# Patient Record
Sex: FEMALE | Race: WHITE | NOT HISPANIC OR LATINO | Employment: OTHER | URBAN - METROPOLITAN AREA
[De-identification: names, ages, dates, MRNs, and addresses within clinical notes are randomized per-mention and may not be internally consistent; named-entity substitution may affect disease eponyms.]

---

## 2019-08-29 ENCOUNTER — PES CALL (OUTPATIENT)
Dept: ADMINISTRATIVE | Facility: CLINIC | Age: 74
End: 2019-08-29

## 2024-11-28 ENCOUNTER — HOSPITAL ENCOUNTER (EMERGENCY)
Facility: OTHER | Age: 79
Discharge: HOME | End: 2024-11-28
Attending: EMERGENCY MEDICINE
Payer: MEDICARE

## 2024-11-28 VITALS
WEIGHT: 130 LBS | HEIGHT: 66 IN | SYSTOLIC BLOOD PRESSURE: 156 MMHG | TEMPERATURE: 98 F | DIASTOLIC BLOOD PRESSURE: 67 MMHG | RESPIRATION RATE: 18 BRPM | BODY MASS INDEX: 20.89 KG/M2 | OXYGEN SATURATION: 98 % | HEART RATE: 86 BPM

## 2024-11-28 DIAGNOSIS — S09.90XA INJURY OF HEAD, INITIAL ENCOUNTER: Primary | ICD-10-CM

## 2024-11-28 DIAGNOSIS — S00.11XA BLACK EYE OF RIGHT SIDE, INITIAL ENCOUNTER: ICD-10-CM

## 2024-11-28 PROCEDURE — 99285 EMERGENCY DEPT VISIT HI MDM: CPT | Mod: 25

## 2024-11-28 NOTE — DISCHARGE INSTRUCTIONS
Mrs. Engel,    Thank you for letting me care for you today! It was nice meeting you, and I hope you feel better soon.   If you would like access to your chart and what was done today please utilize the [facility].   Please come back to [facility] for all of your future medical needs.    Our goal in the emergency department is to always give you outstanding care and exceptional service. You may receive a survey by mail or e-mail in the next week regarding your experience in our ED. We would greatly appreciate you completing and returning the survey. Your feedback provides us with a way to recognize our staff who give very good care and it helps us learn how to improve when your experience was below our aspiration of excellence.     Sincerely,    Robert Lewis MD  Board Certified Emergency Physician

## 2024-11-28 NOTE — ED PROVIDER NOTES
Encounter Date: 2024       History     Chief Complaint   Patient presents with    Head Injury     Struck head on the shower when exiting this am. No LOC, not on blood thinners     This is a pleasant 79-year-old female presenting for evaluation of an episode today in which she hit the right side of her face while exiting the shower.  She had a brief period thereafter in which she laid on the ground.  Her son at the bedside notes that she has an element of dementia and because of the concern for a potential more serious head injury had presented to the emergency department.  He notes that she is not on any anticoagulation chronically at this time.    He works as an orthopedic surgeon in his mother is retired but in town visiting for the holiday currently, Thanksgiving.  She is otherwise behaving appropriately and at her neurologic baseline.    The history is provided by the patient and medical records.     Review of patient's allergies indicates:   Allergen Reactions    Codeine Nausea And Vomiting    Shellfish containing products Nausea And Vomiting     History reviewed. No pertinent past medical history.  Past Surgical History:   Procedure Laterality Date    BREAST SURGERY       SECTION      melanoma Right     arm, [facility]     No family history on file.  Social History     Tobacco Use    Smoking status: Former   Substance Use Topics    Alcohol use: No     Review of Systems  Constitutional-no fever  HEENT-positive bruised face  Eyes-no redness  Respiratory-no shortness of breath  Cardio-no chest pain  GI-no abdominal pain  Endocrine-no cold intolerance  -no difficulty urinating  MSK-no myalgias  Skin-no rashes  Allergy-no environmental allergy  Neurologic-, no headache  Hematology-no swollen nodes  Behavioral-no confusion  Physical Exam     Initial Vitals   BP Pulse Resp Temp SpO2   24 1401 24 1401 24 1401 24 1438 24 1401   (!) 156/67 86 18 97.7 °F (36.5 °C) 98 %      MAP        --                Physical Exam  Constitutional:  Generally well-appearing 79-year-old female in no obvious distress  Eyes: Conjunctivae normal.  Extraocular movements intact, pupils 2 mm briskly reactive and symmetric  ENT       Head: Normocephalic, atraumatic.  Mild ecchymoses lateral to the right eye and over the right zygoma, no fluctuance, no midface instability       Nose: Normal external appearance        Mouth/Throat: no strigulous respirations   Hematological/Lymphatic/Immunilogical: no visible lymphadenopathy   Cardiovascular: Normal rate,   Respiratory: Normal respiratory effort.   Gastrointestinal: non distended   Musculoskeletal: Normal range of motion in all extremities. No obvious deformities or swelling.  Neurologic: Alert, oriented. Normal speech and language. No gross focal neurologic deficits are appreciated.  Skin: Skin is warm, dry. No rash noted.  Psychiatric: Mood and affect are normal.   ED Course   Procedures  Labs Reviewed - No data to display       Imaging Results              CT Maxillofacial Without Contrast (Final result)  Result time 11/28/24 15:19:29      Final result by Timmy Flores MD (11/28/24 15:19:29)                   Impression:      No acute abnormality.      Electronically signed by: Timmy Flores  Date:    11/28/2024  Time:    15:19               Narrative:    EXAMINATION:  CT MAXILLOFACIAL WITHOUT CONTRAST    CLINICAL HISTORY:  Facial trauma, blunt;    TECHNIQUE:  Low dose axial images, sagittal and coronal reformations were obtained through the face.  Contrast was not administered.    COMPARISON:  None    FINDINGS:  No acute facial fractures are detected.    Nasal bones are intact.  The zygomatic arches are intact.  The orbits are intact.  Nasal septum is midline.    The mandible is intact.    Paranasal sinuses are clear.  Globes appear within normal limits.    No soft tissue mass or fluid collection.                                       CT Head Without Contrast  (Final result)  Result time 11/28/24 15:12:26      Final result by Timmy Flores MD (11/28/24 15:12:26)                   Impression:      1. No acute intracranial process.  2. Involutional changes with chronic microvascular ischemic changes.      Electronically signed by: Timmy Flores  Date:    11/28/2024  Time:    15:12               Narrative:    EXAMINATION:  CT HEAD WITHOUT CONTRAST    CLINICAL HISTORY:  Head trauma, minor (Age >= 65y);    TECHNIQUE:  Low dose axial CT images obtained throughout the head without intravenous contrast. Sagittal and coronal reconstructions were performed.    COMPARISON:  None.    FINDINGS:  Intracranial compartment:    Ventricles and sulci are normal in size for age without evidence of hydrocephalus. No extra-axial blood or fluid collections.    Moderate involutional changes with chronic microvascular ischemic changes in the periventricular white matter.  No parenchymal mass, hemorrhage, edema or major vascular distribution infarct.    Skull/extracranial contents (limited evaluation): No fracture. Mastoid air cells and paranasal sinuses are essentially clear.                                       Medications - No data to display  Medical Decision Making  Differential diagnosis-contusion, concussion, intraparenchymal hemorrhage, skull fracture    Problems Addressed:  Black eye of right side, initial encounter: acute illness or injury  Injury of head, initial encounter: acute illness or injury    Amount and/or Complexity of Data Reviewed  Independent Historian: caregiver     Details: Son at the bedside who works as an orthopedic surgeon notes that she had minor head injury but a brief period of time where she laid on the ground thereafter.      Radiology: ordered and independent interpretation performed. Decision-making details documented in ED Course.    Risk  OTC drugs.  Prescription drug management.  Diagnosis or treatment significantly limited by social determinants of  health.  Risk Details: Dementia complicates this patient's care has a social determinants of health                                      Clinical Impression:  Final diagnoses:  [S09.90XA] Injury of head, initial encounter (Primary)  [S00.11XA] Black eye of right side, initial encounter          ED Disposition Condition    Discharge Stable          ED Prescriptions    None       Follow-up Information       Follow up With Specialties Details Why Contact Info    [facility] - Emergency Dept Emergency Medicine Go to  As needed, For a follow up visit about today [address] [facility] [id number] [phone]             Robert Lewis MD  11/30/24 [id number]

## 2024-12-02 ENCOUNTER — HOSPITAL ENCOUNTER (OUTPATIENT)
Facility: HOSPITAL | Age: 79
Discharge: HOME | End: 2024-12-04
Attending: EMERGENCY MEDICINE | Admitting: EMERGENCY MEDICINE
Payer: MEDICARE

## 2024-12-02 DIAGNOSIS — R55 SYNCOPE: Primary | ICD-10-CM

## 2024-12-02 DIAGNOSIS — R42 DIZZINESS: ICD-10-CM

## 2024-12-02 DIAGNOSIS — R55 SYNCOPE, UNSPECIFIED SYNCOPE TYPE: ICD-10-CM

## 2024-12-02 LAB
ALBUMIN SERPL BCP-MCNC: 3.8 G/DL (ref 3.5–5.2)
ALP SERPL-CCNC: 65 U/L (ref 40–150)
ALT SERPL W/O P-5'-P-CCNC: 20 U/L (ref 10–44)
ANION GAP SERPL CALC-SCNC: 10 MMOL/L (ref 8–16)
AST SERPL-CCNC: 25 U/L (ref 10–40)
BASOPHILS # BLD AUTO: 0.03 K/UL (ref 0–0.2)
BASOPHILS NFR BLD: 0.3 % (ref 0–1.9)
BILIRUB SERPL-MCNC: 0.5 MG/DL (ref 0.1–1)
BNP SERPL-MCNC: 106 PG/ML (ref 0–99)
BUN SERPL-MCNC: 17 MG/DL (ref 8–23)
CALCIUM SERPL-MCNC: 9 MG/DL (ref 8.7–10.5)
CHLORIDE SERPL-SCNC: 105 MMOL/L (ref 95–110)
CO2 SERPL-SCNC: 24 MMOL/L (ref 23–29)
CREAT SERPL-MCNC: 0.9 MG/DL (ref 0.5–1.4)
DIFFERENTIAL METHOD BLD: ABNORMAL
EOSINOPHIL # BLD AUTO: 0 K/UL (ref 0–0.5)
EOSINOPHIL NFR BLD: 0.1 % (ref 0–8)
ERYTHROCYTE [DISTWIDTH] IN BLOOD BY AUTOMATED COUNT: 12.6 % (ref 11.5–14.5)
EST. GFR  (NO RACE VARIABLE): >60 ML/MIN/1.73 M^2
GLUCOSE SERPL-MCNC: 118 MG/DL (ref 70–110)
HCT VFR BLD AUTO: 38.7 % (ref 37–48.5)
HGB BLD-MCNC: 12.7 G/DL (ref 12–16)
IMM GRANULOCYTES # BLD AUTO: 0.03 K/UL (ref 0–0.04)
IMM GRANULOCYTES NFR BLD AUTO: 0.3 % (ref 0–0.5)
LYMPHOCYTES # BLD AUTO: 0.8 K/UL (ref 1–4.8)
LYMPHOCYTES NFR BLD: 9.4 % (ref 18–48)
MCH RBC QN AUTO: 33.2 PG (ref 27–31)
MCHC RBC AUTO-ENTMCNC: 32.8 G/DL (ref 32–36)
MCV RBC AUTO: 101 FL (ref 82–98)
MONOCYTES # BLD AUTO: 0.5 K/UL (ref 0.3–1)
MONOCYTES NFR BLD: 5.7 % (ref 4–15)
NEUTROPHILS # BLD AUTO: 7.4 K/UL (ref 1.8–7.7)
NEUTROPHILS NFR BLD: 84.2 % (ref 38–73)
NRBC BLD-RTO: 0 /100 WBC
PLATELET # BLD AUTO: 199 K/UL (ref 150–450)
PMV BLD AUTO: 9.6 FL (ref 9.2–12.9)
POTASSIUM SERPL-SCNC: 3.8 MMOL/L (ref 3.5–5.1)
PROT SERPL-MCNC: 7 G/DL (ref 6–8.4)
RBC # BLD AUTO: 3.82 M/UL (ref 4–5.4)
SODIUM SERPL-SCNC: 139 MMOL/L (ref 136–145)
TROPONIN I SERPL DL<=0.01 NG/ML-MCNC: 0.01 NG/ML (ref 0–0.03)
TROPONIN I SERPL DL<=0.01 NG/ML-MCNC: <0.006 NG/ML (ref 0–0.03)
WBC # BLD AUTO: 8.8 K/UL (ref 3.9–12.7)

## 2024-12-02 PROCEDURE — 84484 ASSAY OF TROPONIN QUANT: CPT | Performed by: EMERGENCY MEDICINE

## 2024-12-02 PROCEDURE — G0378 HOSPITAL OBSERVATION PER HR: HCPCS

## 2024-12-02 PROCEDURE — 85025 COMPLETE CBC W/AUTO DIFF WBC: CPT | Performed by: EMERGENCY MEDICINE

## 2024-12-02 PROCEDURE — 93010 ELECTROCARDIOGRAM REPORT: CPT | Mod: ,,, | Performed by: INTERNAL MEDICINE

## 2024-12-02 PROCEDURE — 83880 ASSAY OF NATRIURETIC PEPTIDE: CPT | Performed by: EMERGENCY MEDICINE

## 2024-12-02 PROCEDURE — 80053 COMPREHEN METABOLIC PANEL: CPT | Performed by: EMERGENCY MEDICINE

## 2024-12-02 PROCEDURE — 93005 ELECTROCARDIOGRAM TRACING: CPT

## 2024-12-02 PROCEDURE — 25000003 PHARM REV CODE 250: Performed by: PHYSICIAN ASSISTANT

## 2024-12-02 RX ORDER — DONEPEZIL HYDROCHLORIDE 5 MG/1
5 TABLET, FILM COATED ORAL NIGHTLY
Status: DISCONTINUED | OUTPATIENT
Start: 2024-12-02 | End: 2024-12-02

## 2024-12-02 RX ORDER — ACETAMINOPHEN 500 MG
1000 TABLET ORAL EVERY 8 HOURS PRN
Status: DISCONTINUED | OUTPATIENT
Start: 2024-12-02 | End: 2024-12-04 | Stop reason: HOSPADM

## 2024-12-02 RX ORDER — DONEPEZIL HYDROCHLORIDE 5 MG/1
10 TABLET, FILM COATED ORAL NIGHTLY
Status: DISCONTINUED | OUTPATIENT
Start: 2024-12-02 | End: 2024-12-04 | Stop reason: HOSPADM

## 2024-12-02 RX ORDER — METOCLOPRAMIDE HYDROCHLORIDE 5 MG/ML
10 INJECTION INTRAMUSCULAR; INTRAVENOUS EVERY 6 HOURS PRN
Status: DISCONTINUED | OUTPATIENT
Start: 2024-12-02 | End: 2024-12-04 | Stop reason: HOSPADM

## 2024-12-02 RX ORDER — DONEPEZIL HYDROCHLORIDE 10 MG/1
10 TABLET, FILM COATED ORAL DAILY
COMMUNITY
Start: 2024-11-18

## 2024-12-02 RX ORDER — ONDANSETRON HYDROCHLORIDE 2 MG/ML
4 INJECTION, SOLUTION INTRAVENOUS EVERY 6 HOURS PRN
Status: DISCONTINUED | OUTPATIENT
Start: 2024-12-02 | End: 2024-12-04 | Stop reason: HOSPADM

## 2024-12-02 RX ADMIN — DONEPEZIL HYDROCHLORIDE 10 MG: 10 TABLET ORAL at 09:12

## 2024-12-02 NOTE — ED PROVIDER NOTES
Chief Complaint   Loss of Consciousness (Pt had two episodes of syncope today. Pt seen recently for a fall on . )      History Of Present Illness   Parvin Engel is a 79 y.o. female presenting with be while at the airport today.  She was waiting to board a flight to return home after visiting her son and daughter-in-law, who are both [facility] physicians.  Family member who accompanied her and states that she passed out while seated in the gate waiting area for her flight.  The patient denies prodrome.  No history of dysrhythmia.  She had felt well today before this occurred.  Her son states that she slipped in the shower 3 days ago and went to [facility] where she had a head CT.  She has not fallen since.  She has some bruising on her eye that was from that fall.    Independent Historian: Patient's son provided additional details    Review of patient's allergies indicates:   Allergen Reactions    Codeine Nausea And Vomiting    Shellfish containing products Nausea And Vomiting       No current facility-administered medications on file prior to encounter.     Current Outpatient Medications on File Prior to Encounter   Medication Sig Dispense Refill    aspirin (ECOTRIN) 325 MG EC tablet Take 1 tablet (325 mg total) by mouth 2 (two) times daily. 42 tablet 0    calcium citrate-vitamin D3 315-200 mg (CITRACAL+D) 315-200 mg-unit per tablet Take 1 tablet by mouth 2 (two) times daily.      hydrocodone-acetaminophen 10-325mg (NORCO)  mg Tab Take 1 tablet by mouth every 4 to 6 hours as needed. 40 tablet 0    tramadol (ULTRAM) 50 mg tablet Take 1 tablet (50 mg total) by mouth every 4 to 6 hours as needed for Pain. 40 tablet 0       Past History   As per HPI and below:  No past medical history on file.  Past Surgical History:   Procedure Laterality Date    BREAST SURGERY       SECTION      melanoma Right     arm, [address] [place] [address]       Social History     Tobacco Use    Smoking status: Former   Substance Use  "Topics    Alcohol use: No       No family history on file.    Physical Exam     Vitals:    12/02/24 1653 12/02/24 1750 12/02/24 1900   BP: 130/80 (!) 178/75 (!) 158/71   BP Location:  Right arm    Pulse: 80 76 76   Resp: 18 18 15   Temp: 98.2 °F (36.8 °C) 98 °F (36.7 °C)    TempSrc: Oral Oral    SpO2: 97% 100% 99%   Weight: 59 kg (130 lb) 59 kg (130 lb 1.1 oz)    Height:  5' 6" (1.676 m)      Appearance: No acute distress.  Skin: No rashes seen.  Good turgor.  No abrasions.  No ecchymoses.  Eyes: No conjunctival injection.  Ecchymosis around left lower orbit area.  ENT: Oropharynx clear.    Chest: Clear to auscultation bilaterally.  Good air movement.  No wheezes.  No rhonchi.  Cardiovascular: Regular rate and rhythm.  No murmurs. No gallops. No rubs.  Abdomen: Soft.  Not distended.  Nontender.  No guarding.  No rebound.  Musculoskeletal: Good range of motion all joints.  No deformities.  Neck supple.  No meningismus.  Neurologic: Motor intact.  Sensation intact.   Cranial nerves intact.  Mental Status:  Alert and oriented x 3.  Appropriate, conversant.      Initial MDM   Syncope while seated without prodrome at the airport today.  This is concerning for cardiogenic syncope.  Differential also includes vasovagal, reflex syncope.  She will need to be observed on telemetry for 24 hours.  Will do cardiac workup.      Medications Given   Medications - No data to display    Results and Course     Abnormal Labs Reviewed   CBC W/ AUTO DIFFERENTIAL - Abnormal; Notable for the following components:       Result Value    RBC 3.82 (*)      (*)     MCH 33.2 (*)     Lymph # 0.8 (*)     Gran % 84.2 (*)     Lymph % 9.4 (*)     All other components within normal limits   COMPREHENSIVE METABOLIC PANEL - Abnormal; Notable for the following components:    Glucose 118 (*)     All other components within normal limits   B-TYPE NATRIURETIC PEPTIDE - Abnormal; Notable for the following components:     (*)     All other " components within normal limits       Imaging Results    None         ED Course as of 12/02/24 1927   Mon Dec 02, 2024   1746 EKG 12-lead  EKG shows normal sinus rhythm and no acute ischemia per my independent interpretation.     [DC]   1846 WBC: 8.80 [DC]   1846 Hemoglobin: 12.7 [DC]   1846 Platelet Count: 199 [DC]   1917 BNP(!): 106 [DC]   1917 Troponin I: 0.015 [DC]   1918 Creatinine: 0.9 [DC]      ED Course User Index  [DC] Jesu Dias MD               MDM, Impression and Plan   79 y.o. female with syncope, no prodrome, no trauma.  Initial workup benign.  Discussed with observation team for observation         Final diagnoses:  [R55] Syncope (Primary)        ED Disposition Condition    Observation Stable                  Jesu Dias MD  12/02/24 1929

## 2024-12-03 PROBLEM — R55 SYNCOPE: Status: ACTIVE | Noted: 2024-12-03

## 2024-12-03 LAB
ANION GAP SERPL CALC-SCNC: 13 MMOL/L (ref 8–16)
ASCENDING AORTA: 3.47 CM
AV AREA BY CONTINUOUS VTI: 2.6 CM2
AV INDEX (PROSTH): 0.85
AV LVOT MEAN GRADIENT: 1 MMHG
AV LVOT PEAK GRADIENT: 2 MMHG
AV MEAN GRADIENT: 1.7 MMHG
AV PEAK GRADIENT: 2 MMHG
AV VALVE AREA BY VELOCITY RATIO: 3.1 CM²
AV VALVE AREA: 2.7 CM2
AV VELOCITY RATIO: 1
BASOPHILS # BLD AUTO: 0.03 K/UL (ref 0–0.2)
BASOPHILS NFR BLD: 0.5 % (ref 0–1.9)
BILIRUB UR QL STRIP: NEGATIVE
BSA FOR ECHO PROCEDURE: 1.66 M2
BUN SERPL-MCNC: 15 MG/DL (ref 8–23)
CALCIUM SERPL-MCNC: 8.5 MG/DL (ref 8.7–10.5)
CHLORIDE SERPL-SCNC: 106 MMOL/L (ref 95–110)
CLARITY UR REFRACT.AUTO: CLEAR
CO2 SERPL-SCNC: 21 MMOL/L (ref 23–29)
COLOR UR AUTO: YELLOW
CREAT SERPL-MCNC: 0.8 MG/DL (ref 0.5–1.4)
CV ECHO LV RWT: 0.37 CM
DIFFERENTIAL METHOD BLD: ABNORMAL
DOP CALC AO PEAK VEL: 0.7 M/S
DOP CALC AO VTI: 17.6 CM
DOP CALC LVOT AREA: 3.1 CM2
DOP CALC LVOT DIAMETER: 2 CM
DOP CALC LVOT PEAK VEL: 0.7 M/S
DOP CALC LVOT STROKE VOLUME: 47.1 CM3
DOP CALCLVOT PEAK VEL VTI: 15 CM
E WAVE DECELERATION TIME: 132.8 MS
E/A RATIO: 0.62
E/E' RATIO: 8.4 M/S
ECHO EF ESTIMATED: 72 %
ECHO LV POSTERIOR WALL: 0.7 CM (ref 0.6–1.1)
EOSINOPHIL # BLD AUTO: 0.1 K/UL (ref 0–0.5)
EOSINOPHIL NFR BLD: 0.8 % (ref 0–8)
ERYTHROCYTE [DISTWIDTH] IN BLOOD BY AUTOMATED COUNT: 12.6 % (ref 11.5–14.5)
EST. GFR  (NO RACE VARIABLE): >60 ML/MIN/1.73 M^2
FRACTIONAL SHORTENING: 39.5 % (ref 28–44)
GLUCOSE SERPL-MCNC: 91 MG/DL (ref 70–110)
GLUCOSE UR QL STRIP: NEGATIVE
HCT VFR BLD AUTO: 35.8 % (ref 37–48.5)
HGB BLD-MCNC: 12.2 G/DL (ref 12–16)
HGB UR QL STRIP: NEGATIVE
IMM GRANULOCYTES # BLD AUTO: 0.02 K/UL (ref 0–0.04)
IMM GRANULOCYTES NFR BLD AUTO: 0.3 % (ref 0–0.5)
INTERVENTRICULAR SEPTUM: 0.9 CM (ref 0.6–1.1)
KETONES UR QL STRIP: ABNORMAL
LA MAJOR: 3.72 CM
LA MINOR: 3.18 CM
LA WIDTH: 3.54 CM
LEFT ATRIUM SIZE: 2.93 CM
LEFT ATRIUM VOLUME INDEX: 18.1 ML/M2
LEFT ATRIUM VOLUME: 30.23 CM3
LEFT INTERNAL DIMENSION IN SYSTOLE: 2.3 CM (ref 2.1–4)
LEFT VENTRICLE DIASTOLIC VOLUME INDEX: 38.09 ML/M2
LEFT VENTRICLE DIASTOLIC VOLUME: 63.61 ML
LEFT VENTRICLE MASS INDEX: 51.5 G/M2
LEFT VENTRICLE SYSTOLIC VOLUME INDEX: 10.8 ML/M2
LEFT VENTRICLE SYSTOLIC VOLUME: 18.04 ML
LEFT VENTRICULAR INTERNAL DIMENSION IN DIASTOLE: 3.8 CM (ref 3.5–6)
LEFT VENTRICULAR MASS: 86 G
LEUKOCYTE ESTERASE UR QL STRIP: NEGATIVE
LV LATERAL E/E' RATIO: 7
LV SEPTAL E/E' RATIO: 10.5
LYMPHOCYTES # BLD AUTO: 1.7 K/UL (ref 1–4.8)
LYMPHOCYTES NFR BLD: 28.8 % (ref 18–48)
MCH RBC QN AUTO: 33.9 PG (ref 27–31)
MCHC RBC AUTO-ENTMCNC: 34.1 G/DL (ref 32–36)
MCV RBC AUTO: 99 FL (ref 82–98)
MONOCYTES # BLD AUTO: 0.5 K/UL (ref 0.3–1)
MONOCYTES NFR BLD: 7.4 % (ref 4–15)
MV PEAK A VEL: 0.68 M/S
MV PEAK E VEL: 0.42 M/S
NEUTROPHILS # BLD AUTO: 3.8 K/UL (ref 1.8–7.7)
NEUTROPHILS NFR BLD: 62.2 % (ref 38–73)
NITRITE UR QL STRIP: NEGATIVE
NRBC BLD-RTO: 0 /100 WBC
OHS CV RV/LV RATIO: 0.97 CM
OHS QRS DURATION: 76 MS
OHS QTC CALCULATION: 463 MS
PH UR STRIP: 6 [PH] (ref 5–8)
PISA TR MAX VEL: 2.57 M/S
PLATELET # BLD AUTO: 196 K/UL (ref 150–450)
PMV BLD AUTO: 9.9 FL (ref 9.2–12.9)
POTASSIUM SERPL-SCNC: 3.7 MMOL/L (ref 3.5–5.1)
PROT UR QL STRIP: NEGATIVE
RBC # BLD AUTO: 3.6 M/UL (ref 4–5.4)
RIGHT VENTRICLE DIASTOLIC BASEL DIMENSION: 3.7 CM
RV TISSUE DOPPLER FREE WALL SYSTOLIC VELOCITY 1 (APICAL 4 CHAMBER VIEW): 15.45 CM/S
SINUS: 3.26 CM
SODIUM SERPL-SCNC: 140 MMOL/L (ref 136–145)
SP GR UR STRIP: 1.02 (ref 1–1.03)
STJ: 2.8 CM
TDI LATERAL: 0.06 M/S
TDI SEPTAL: 0.04 M/S
TDI: 0.05 M/S
TR MAX PG: 26 MMHG
TRICUSPID ANNULAR PLANE SYSTOLIC EXCURSION: 3.03 CM
TV PEAK GRADIENT: 26 MMHG
URN SPEC COLLECT METH UR: ABNORMAL
WBC # BLD AUTO: 6.05 K/UL (ref 3.9–12.7)
Z-SCORE OF LEFT VENTRICULAR DIMENSION IN END DIASTOLE: -2.06
Z-SCORE OF LEFT VENTRICULAR DIMENSION IN END SYSTOLE: -1.81

## 2024-12-03 PROCEDURE — 25000003 PHARM REV CODE 250: Performed by: PHYSICIAN ASSISTANT

## 2024-12-03 PROCEDURE — 81003 URINALYSIS AUTO W/O SCOPE: CPT | Performed by: PHYSICIAN ASSISTANT

## 2024-12-03 PROCEDURE — G0378 HOSPITAL OBSERVATION PER HR: HCPCS

## 2024-12-03 PROCEDURE — 99204 OFFICE O/P NEW MOD 45 MIN: CPT | Mod: ,,, | Performed by: PSYCHIATRY & NEUROLOGY

## 2024-12-03 PROCEDURE — 25500020 PHARM REV CODE 255: Performed by: NURSE PRACTITIONER

## 2024-12-03 PROCEDURE — 99204 OFFICE O/P NEW MOD 45 MIN: CPT | Mod: ,,, | Performed by: INTERNAL MEDICINE

## 2024-12-03 PROCEDURE — 63600175 PHARM REV CODE 636 W HCPCS: Performed by: PHYSICIAN ASSISTANT

## 2024-12-03 PROCEDURE — 85025 COMPLETE CBC W/AUTO DIFF WBC: CPT | Performed by: PHYSICIAN ASSISTANT

## 2024-12-03 PROCEDURE — 99204 OFFICE O/P NEW MOD 45 MIN: CPT | Mod: GC,,, | Performed by: STUDENT IN AN ORGANIZED HEALTH CARE EDUCATION/TRAINING PROGRAM

## 2024-12-03 PROCEDURE — 80048 BASIC METABOLIC PNL TOTAL CA: CPT | Performed by: PHYSICIAN ASSISTANT

## 2024-12-03 PROCEDURE — A9585 GADOBUTROL INJECTION: HCPCS | Performed by: NURSE PRACTITIONER

## 2024-12-03 RX ORDER — GADOBUTROL 604.72 MG/ML
6 INJECTION INTRAVENOUS
Status: COMPLETED | OUTPATIENT
Start: 2024-12-03 | End: 2024-12-03

## 2024-12-03 RX ADMIN — DONEPEZIL HYDROCHLORIDE 10 MG: 10 TABLET ORAL at 08:12

## 2024-12-03 RX ADMIN — GADOBUTROL 6 ML: 604.72 INJECTION INTRAVENOUS at 04:12

## 2024-12-03 RX ADMIN — SODIUM CHLORIDE, POTASSIUM CHLORIDE, SODIUM LACTATE AND CALCIUM CHLORIDE 500 ML: 600; 310; 30; 20 INJECTION, SOLUTION INTRAVENOUS at 10:12

## 2024-12-03 NOTE — PROGRESS NOTES
ED Observation Unit  Progress Note      HPI   Parvin Engel is a 79 y.o. female with a history of Alzheimer's dementia who presents to [facility] ED on 2024 for emergent evaluation of syncope today.      Patient was at the airport waiting to board a flight to return home after visiting her son and daughter-in-law, who are both [facility] physicians.  Family member who accompanied her states that she passed out while seated in the gate waiting area for her flight.  She is alert and oriented to person, , and situation, but not year or location. The patient denies prodrome symptoms prior to syncope today. She had felt well today before this occurred.  Her son states that she slipped in the shower 3 days ago and went to [facility] ED where she had a head CT that was negative.  She has not fallen since.  She has some bruising on her eye that was from that fall. Son also mentions a similar episode over the summer while she pasted out at the park. They have also noticed patient having episodes of clamminess with exertion in the past, but none recently. She did eat lunch today. She denies neck pain, back pain, chest pain, SOB, abd pain, extremity weakness, paresthesias, history of arrhythmias, MIs, HF, DVT/PE, hormone use, tobacco use, or cancer. She also denies infections symptoms such as cough, nausea, vomiting, diarrhea, dysuria, frequency.     In the ED, ECG with NSR. Nonspecific T wave changes noted. Normal intervals. No STEMI. Labs without signs of leukocytosis, anemia, electrolyte abn, or KADE. Glucose 118. Normal LFTs. 1st troponin 0.015. .      I reviewed the ED Provider Note dated 2024 prior to my evaluation of this patient.  I reviewed all labs and imaging performed in the Main ED, prior to patient being placed in Observation. Patient was placed in the ED Observation Unit for syncope.     Interval History   ***    PMHx   History reviewed. No pertinent past medical history.   Past Surgical History:    Procedure Laterality Date    BREAST SURGERY       SECTION      [facility]        Family Hx   No family history on file.     Social Hx   Social History     Socioeconomic History    Marital status:    Tobacco Use    Smoking status: Former   Substance and Sexual Activity    Alcohol use: No     Social Drivers of Health     Housing Stability: Low Risk  (12/3/2024)    Housing Stability Vital Sign     Unable to Pay for Housing in the Last Year: No     Homeless in the Last Year: No        Vital Signs   Vitals:    24 2156 24 0346 24 0626 24 0730   BP: (!) 155/72 121/67 (!) 147/67 (!) 140/65   BP Location:    Left arm   Patient Position:    Lying   Pulse: 80 74 80 69   Resp: 20   16   Temp:  98.2 °F (36.8 °C) 98.1 °F (36.7 °C) 98 °F (36.7 °C)   TempSrc:  Oral Oral Oral   SpO2: 98% 96% 98% 95%   Weight:       Height:            Review of Systems  Per HPI and interval history.    Brief Physical Exam/Reassessment   Constitutional:       General: She is not in acute distress.     Appearance: She is not ill-appearing, toxic-appearing or diaphoretic.   HENT:      Head: Contusion present. No raccoon eyes.        Right Ear: External ear normal.      Left Ear: External ear normal.      Nose: Nose normal.   Eyes:      General: No scleral icterus.     Extraocular Movements: Extraocular movements intact.      Right eye: No nystagmus.      Left eye: No nystagmus.      Conjunctiva/sclera:      Right eye: Right conjunctiva is not injected.   Cardiovascular:      Rate and Rhythm: Normal rate and regular rhythm.   Pulmonary:      Effort: No respiratory distress.      Breath sounds: No wheezing.   Abdominal:      General: There is no distension.   Musculoskeletal:         General: Normal range of motion.      Cervical back: Normal range of motion. No rigidity. No pain with movement. Normal range of motion.      Right lower leg: No edema.      Comments: FROM and 5/5 strength  throughout. Sensations grossly intact. Able to bear weight and ambulate without assistance.   Skin:     Coloration: Skin is not jaundiced.      Findings: No rash.   Neurological:      General: No focal deficit present.      Mental Status: She is alert. Mental status is at baseline.      Comments: Alert and oriented to person, , and situation, but not year or location.     Labs/Imaging   Labs Reviewed   CBC W/ AUTO DIFFERENTIAL - Abnormal       Result Value    WBC 8.80      RBC 3.82 (*)     Hemoglobin 12.7      Hematocrit 38.7       (*)     MCH 33.2 (*)     MCHC 32.8      RDW 12.6      Platelets 199      MPV 9.6      Immature Granulocytes 0.3      Gran # (ANC) 7.4      Immature Grans (Abs) 0.03      Lymph # 0.8 (*)     Mono # 0.5      Eos # 0.0      Baso # 0.03      nRBC 0      Gran % 84.2 (*)     Lymph % 9.4 (*)     Mono % 5.7      Eosinophil % 0.1      Basophil % 0.3      Differential Method Automated     COMPREHENSIVE METABOLIC PANEL - Abnormal    Sodium 139      Potassium 3.8      Chloride 105      CO2 24      Glucose 118 (*)     BUN 17      Creatinine 0.9      Calcium 9.0      Total Protein 7.0      Albumin 3.8      Total Bilirubin 0.5      Alkaline Phosphatase 65      AST 25      ALT 20      eGFR >60.0      Anion Gap 10     B-TYPE NATRIURETIC PEPTIDE - Abnormal     (*)    URINALYSIS, REFLEX TO URINE CULTURE - Abnormal    Specimen UA Urine, Clean Catch      Color, UA Yellow      Appearance, UA Clear      pH, UA 6.0      Specific Gravity, UA 1.020      Protein, UA Negative      Glucose, UA Negative      Ketones, UA 2+ (*)     Bilirubin (UA) Negative      Occult Blood UA Negative      Nitrite, UA Negative      Leukocytes, UA Negative      Narrative:     Specimen Source->Urine   CBC W/ AUTO DIFFERENTIAL - Abnormal    WBC 6.05      RBC 3.60 (*)     Hemoglobin 12.2      Hematocrit 35.8 (*)     MCV 99 (*)     MCH 33.9 (*)     MCHC 34.1      RDW 12.6      Platelets 196      MPV 9.9      Immature  Granulocytes 0.3      Gran # (ANC) 3.8      Immature Grans (Abs) 0.02      Lymph # 1.7      Mono # 0.5      Eos # 0.1      Baso # 0.03      nRBC 0      Gran % 62.2      Lymph % 28.8      Mono % 7.4      Eosinophil % 0.8      Basophil % 0.5      Differential Method Automated     BASIC METABOLIC PANEL - Abnormal    Sodium 140      Potassium 3.7      Chloride 106      CO2 21 (*)     Glucose 91      BUN 15      Creatinine 0.8      Calcium 8.5 (*)     Anion Gap 13      eGFR >60.0     TROPONIN I    Troponin I 0.015     TROPONIN I    Troponin I <0.006        Imaging Results              US Carotid Bilateral (Final result)  Result time 12/02/24 23:49:04      Final result by John Bianchi DO (12/02/24 23:49:04)                   Impression:      No evidence of a hemodynamically significant carotid bifurcation stenosis.      Electronically signed by: John Bianchi  Date:    12/02/2024  Time:    23:49               Narrative:    EXAMINATION:  US CAROTID BILATERAL    CLINICAL HISTORY:  Syncope and collapse    TECHNIQUE:  Grayscale and color Doppler ultrasound examination of the carotid and vertebral artery systems bilaterally.  Stenosis estimates are per the NASCET measurement criteria.    COMPARISON:  None.    FINDINGS:  Right:    Internal Carotid Artery (ICA) peak systolic velocity 112 cm/sec    ICA/CCA peak systolic velocity ratio: 1.6    Plaque formation: Homogeneous    Vertebral artery: Antegrade flow and normal waveform.    Left:    Internal Carotid Artery (ICA)  peak systolic velocity 121 cm/sec    ICA/CCA peak systolic velocity ratio: 1.4    Plaque formation: Homogeneous    Vertebral artery: Antegrade flow and normal waveform.                                       CT Head Without Contrast (Final result)  Result time 12/02/24 22:48:44      Final result by Nikolas Banegas MD (12/02/24 22:48:44)                   Impression:      No major vascular distribution infarction.    No acute intracranial hemorrhage or  depressed calvarial fracture.    Evidence of chronic microvascular ischemic change.    Electronically signed by resident: Deangelo Ghosh  Date:    12/02/2024  Time:    21:43    Electronically signed by: Nikolas Banegas  Date:    12/02/2024  Time:    22:48               Narrative:    EXAMINATION:  CT HEAD WITHOUT CONTRAST    CLINICAL HISTORY:  Head trauma, minor (Age >= 65y);Mental status change, unknown cause;    TECHNIQUE:  Low dose axial CT images obtained throughout the head without intravenous contrast. Sagittal and coronal reconstructions were performed.    COMPARISON:  CT head 11/28/2024    FINDINGS:  Intracranial compartment:    Ventricles and sulci are unchanged in size and configuration without evidence of hydrocephalus. No extra-axial blood or fluid collections.    The brain parenchyma appears unchanged.  There are patchy areas of hypoattenuation in the supratentorial white matter, which is nonspecific and most likely reflects changes of chronic microvascular ischemic changes.  No new major vascular distribution infarction.  No acute parenchymal hemorrhage.  No mass effect or midline shift.    Coarse intracranial ICA and vertebrobasilar arterial calcifications.    Skull/extracranial contents (limited evaluation):    No depressed calvarial fracture.  Mastoid air cells and paranasal sinuses are essentially clear.                                       I reviewed all labs, imaging reports and EKGs (if performed) associated with this ED/EDOU visit.    Plan   -In the ED, ECG with NSR. Nonspecific T wave changes noted. Normal intervals. No STEMI. Labs without signs of leukocytosis, anemia, electrolyte abn, or KADE. Glucose 118. Normal LFTs. 1st troponin 0.015. .   -No prodromal symptoms today with episode. History of syncope over the summer and symptoms with exertion in the past.  -CT head and US carotid ordered  -Check UA  -ECHO tomorrow  -Tele  -Repeat AM labs    I have discussed this case with SUNNY Graham.

## 2024-12-03 NOTE — ASSESSMENT & PLAN NOTE
Parvin Engel is a 79 y.o. female with a history of Alzheimer's dementia who presents to [facility] ED on 12/2/2024 for emergent evaluation of syncope. Electrophysiology has been consulted for evaluation of  syncope.       EKG 12/02/2024 normal sinus rhythm  EKG 12/2/16 normal sinus rhythm   CT head negative  Telemetry: Normal sinus rhythm  No any lab abnormalities  TTE 12/3/24 showed LVEF 60 - 65%. no other hemodynamically significant structural valvular abnormality.    Continue Telemetry monitoring   Get orthostatic vitals  In view of frequent episodes of syncope without any prodromal symptoms we recommend evaluating the cardiac etiology  Recommend 30 day cardiac event monitor

## 2024-12-03 NOTE — PLAN OF CARE
[facility] Emergency Dept  Initial Discharge Assessment       Primary Care Provider: Sofiya, Primary Doctor    Admission Diagnosis: Syncope [R55]    Admission Date: 12/2/2024  Expected Discharge Date:     Transition of Care Barriers: (P) None, Patient has difficulties with reality. Patient's daughter would be a better person to contact for continuity of care     Payor: MEDICARE / Plan: MEDICARE PART A & B / Product Type: Government /     Extended Emergency Contact Information  Primary Emergency Contact: Johnson Engel  Address: [address] [place] [address]  Home Phone: [phone]  Mobile Phone: [phone]  Relation: Spouse    Discharge Plan A: (P) Return to nursing home  Discharge Plan B: (P) Return to Nursing Home      [facility]  [address] [facility] [address]  [facility] [address]  Phone: [phone] Fax: [phone]      Initial Assessment (most recent)       Adult Discharge Assessment - 12/03/24 0358          Discharge Assessment    Assessment Type Discharge Planning Assessment (P)      Confirmed/corrected address, phone number and insurance Yes (P)      Confirmed Demographics Correct on Facesheet (P)      Source of Information health record (P)      When was your last doctors appointment? -- (P)    unknown    Does patient/caregiver understand observation status Yes (P)      Communicated JOLEEN with patient/caregiver Yes (P)      People in Home facility resident (P)      Facility Arrived From: Nursing home (P)      Do you expect to return to your current living situation? Yes (P)      Do you have help at home or someone to help you manage your care at home? Yes (P)      Who are your caregiver(s) and their phone number(s)? Facility (P)      Prior to hospitilization cognitive status: Not Oriented to Place;Not Oriented to Time (P)      Current cognitive status: Not Oriented to Place;Not Oriented to Time (P)      Walking or Climbing Stairs Difficulty yes (P)       Walking or Climbing Stairs ambulation difficulty, requires equipment (P)      Dressing/Bathing Difficulty yes (P)      Dressing/Bathing bathing difficulty, requires equipment (P)      Home Accessibility wheelchair accessible (P)      Home Layout Able to live on 1st floor (P)      Readmission within 30 days? No (P)      Patient currently being followed by outpatient case management? No (P)      Do you currently have service(s) that help you manage your care at home? No (P)      Do you take prescription medications? Yes (P)      Do you have prescription coverage? Yes (P)      Coverage Medicare (P)      Do you have any problems affording any of your prescribed medications? No (P)      Who is going to help you get home at discharge? Denys (P)      How do you get to doctors appointments? family or friend will provide (P)      Are you on dialysis? No (P)      Do you take coumadin? No (P)      Discharge Plan A Return to nursing home (P)      Discharge Plan B Return to Nursing Home (P)      DME Needed Upon Discharge  none (P)      Transition of Care Barriers None (P)         Physical Activity    On average, how many days per week do you engage in moderate to strenuous exercise (like a brisk walk)? 0 days (P)      On average, how many minutes do you engage in exercise at this level? 0 min (P)         Financial Resource Strain    How hard is it for you to pay for the very basics like food, housing, medical care, and heating? Not hard at all (P)         Housing Stability    In the last 12 months, was there a time when you were not able to pay the mortgage or rent on time? No (P)      At any time in the past 12 months, were you homeless or living in a shelter (including now)? No (P)         Food Insecurity    Within the past 12 months, you worried that your food would run out before you got the money to buy more. Never true (P)      Within the past 12 months, the food you bought just didn't last and you didn't have money  to get more. Never true (P)         Stress    Do you feel stress - tense, restless, nervous, or anxious, or unable to sleep at night because your mind is troubled all the time - these days? Not at all (P)         Social Isolation    How often do you feel lonely or isolated from those around you?  Never (P)         Alcohol Use    Q1: How often do you have a drink containing alcohol? Never (P)      Q2: How many drinks containing alcohol do you have on a typical day when you are drinking? Patient does not drink (P)      Q3: How often do you have six or more drinks on one occasion? Never (P)         Utilities    In the past 12 months has the electric, gas, oil, or water company threatened to shut off services in your home? No (P)         Health Literacy    How often do you need to have someone help you when you read instructions, pamphlets, or other written material from your doctor or pharmacy? Always (P)         OTHER    Name(s) of People in Home As per nurse patient lives in a facility (P)

## 2024-12-03 NOTE — HPI
80 yo F with a pmh of Alzheimer's dementia presented for evaluation of syncope.     Was waiting at the airport to depart to [place] when she was found unresponsive while sitting by family member. She appeared diaphoretic and had increased pulse rate. Returned to consciousness with frequent blinking and returned to baseline after a minute or so. EMS at the time initially recorded a BP around 90s systolic with repeat reported to be 110s systolic. Had reported good PO intake with no recent alcohol intake. Pt denied feeling nauseas, lightheaded, or dizzy prior to syncopal episode. She does report that she occasionally feels episodes of clamminess, sweats at home. Family members report increase sleeping time.     Had a previous episode of syncope in July during a particularly hot day watching a sports game. Of note, she had a fall 11/28 she hit the right side of her face while exiting the shower. She had a brief period thereafter in which she laid on the ground. Had bruising around the right eye. CT head a the time was benign. She also takes infusion treatment for Alzheimer's.     In the ED US carotid and CT head wnl.  and Trop wnl. EKG sinus. No leukocytosis. No hypotension.  UA with +2 ketones, glucose 118.

## 2024-12-03 NOTE — HPI
79 y.o. female with Alzheimer's dementia on lecanemab infusions presents 12/2/24 for syncope evaluation. Per chart review, pt was in town visiting son and daughter in-law for Thanksgiving. She had a fall exiting the shower on Thanksgiving, hit the R side of her face and was seen at [facility] [place] [place] [facility] ED 11/28 and [facility] without contrast unremarkable for acute pathology. She was discharged home. She did well the rest of the weekend and was able to go to [facility] on Sunday. On Monday, she was in the airport about to board a flight to [place] when she had an episode of LOC. She was accompanied by a relative who witnessed the episode. Pt was reportedly seating during the episode then slumped over and was unresponsive for seconds to ~1 minute. Possible diaphoresis during episode. Pt unsure if she had lightheadedness or dizziness prior. She remembers waking up at the airport. When she became responsive again, she was blinking frequently and did not return to cognitive baseline for a minute or so. EMS was called, initial SBP 90s. She wonders if she did not eat enough prior to going to the airport. Glucose was checked and documented to be at least 100. Family notes syncopal episode in July while at an outdoor sporting event. Another relative feels she may be more diaphoretic while exercising recently. She is currently admitted to observation and Cardiology consulted for syncope workup. EKG with sinus rhythm, carotid U/S with no evidence of significant stenosis, BNP and Trop wnl, no hypotension noted, TTE with EF 60-65% and orthostatic vital signs negative. Neurology consulted 12/3 for syncope.

## 2024-12-03 NOTE — CONSULTS
[facility] Dept  Cardiology  Consult Note    Patient Name: Parvin Engel  MRN: [id number]  Admission Date: 12/2/2024  Hospital Length of Stay: 0 days  Code Status: Full Code   Attending Provider: No att. providers found   Consulting Provider: Mathew Harris MD  Primary Care Physician: No, Primary Doctor  Principal Problem:<principal problem not specified>    Patient information was obtained from patient, relative(s), and ER records.     Inpatient consult to Cardiology  Consult performed by: Mathew Harris MD  Consult ordered by: Radha Graham PA-C  Reason for consult: syncope  Assessment/Recommendations: I have personally taken the history and examined the patient and agree with the resident's note as stated above.Needs echo , EP, Neuro stressed hydration        Subjective:     Chief Complaint:  Syncope     HPI:   80 yo F with a pmh of Alzheimer's dementia presented for evaluation of syncope.     Was waiting at the airport to depart to [place] when she was found unresponsive while sitting by family member. She appeared diaphoretic and had increased pulse rate. Returned to consciousness with frequent blinking and returned to baseline after a minute or so. EMS at the time initially recorded a BP around 90s systolic with repeat reported to be 110s systolic. Had reported good PO intake with no recent alcohol intake. Pt denied feeling nauseas, lightheaded, or dizzy prior to syncopal episode. She does report that she occasionally feels episodes of clamminess, sweats at home. Family members report increase sleeping time.     Had a previous episode of syncope in July during a particularly hot day watching a sports game. Of note, she had a fall 11/28 she hit the right side of her face while exiting the shower. She had a brief period thereafter in which she laid on the ground. Had bruising around the right eye. CT head a the time was benign. She also takes infusion treatment for Alzheimer's.     In the ED US  carotid and CT head wnl.  and Trop wnl. EKG sinus. No leukocytosis. No hypotension.  UA with +2 ketones, glucose 118.     History reviewed. No pertinent past medical history.    Past Surgical History:   Procedure Laterality Date    BREAST SURGERY       SECTION      melanoma Right     arm, ish       Review of patient's allergies indicates:   Allergen Reactions    Codeine Nausea And Vomiting    Shellfish containing products Nausea And Vomiting       No current facility-administered medications on file prior to encounter.     Current Outpatient Medications on File Prior to Encounter   Medication Sig    donepeziL (ARICEPT) 10 MG tablet Take 10 mg by mouth once daily.    aspirin (ECOTRIN) 325 MG EC tablet Take 1 tablet (325 mg total) by mouth 2 (two) times daily.    calcium citrate-vitamin D3 315-200 mg (CITRACAL+D) 315-200 mg-unit per tablet Take 1 tablet by mouth 2 (two) times daily.    hydrocodone-acetaminophen 10-325mg (NORCO)  mg Tab Take 1 tablet by mouth every 4 to 6 hours as needed.    tramadol (ULTRAM) 50 mg tablet Take 1 tablet (50 mg total) by mouth every 4 to 6 hours as needed for Pain.     Family History    None       Tobacco Use    Smoking status: Former    Smokeless tobacco: Not on file   Substance and Sexual Activity    Alcohol use: No    Drug use: Not on file    Sexual activity: Not on file     ROS  Objective:     Vital Signs (Most Recent):  Temp: 97.9 °F (36.6 °C) (24 1102)  Pulse: 67 (24 1102)  Resp: 16 (24 1102)  BP: 129/60 (24 1102)  SpO2: 95 % (24 1102) Vital Signs (24h Range):  Temp:  [97.9 °F (36.6 °C)-98.2 °F (36.8 °C)] 97.9 °F (36.6 °C)  Pulse:  [67-83] 67  Resp:  [15-20] 16  SpO2:  [95 %-100 %] 95 %  BP: (121-178)/(60-80) 129/60     Weight: 59 kg (130 lb 1.1 oz)  Body mass index is 20.99 kg/m².    SpO2: 95 %         Intake/Output Summary (Last 24 hours) at 12/3/2024 1148  Last data filed at 12/3/2024 0115  Gross per 24 hour   Intake --    Output 200 ml   Net -200 ml       Lines/Drains/Airways       Peripheral Intravenous Line  Duration                  Peripheral IV - Single Lumen 12/02/24 20 G Right Antecubital 1 day                     Physical Exam  Constitutional:       Appearance: Normal appearance. She is not ill-appearing.   HENT:      Head:      Comments: ecchymosis around the right eye  Eyes:      General:         Right eye: No discharge.         Left eye: No discharge.   Cardiovascular:      Rate and Rhythm: Normal rate and regular rhythm.      Pulses: Normal pulses.      Heart sounds: Normal heart sounds.   Pulmonary:      Effort: Pulmonary effort is normal.      Breath sounds: Normal breath sounds.   Abdominal:      General: Abdomen is flat.      Palpations: Abdomen is soft.      Tenderness: There is no abdominal tenderness.   Musculoskeletal:      Right lower leg: No edema.      Left lower leg: No edema.   Skin:     General: Skin is warm and dry.   Neurological:      Mental Status: She is alert. Mental status is at baseline.   Psychiatric:         Mood and Affect: Mood normal.         Behavior: Behavior normal.         Thought Content: Thought content normal.         Judgment: Judgment normal.          Significant Labs:   Recent Lab Results  (Last 5 results in the past 24 hours)        12/03/24  0412   12/03/24  0049   12/02/24  2032   12/02/24  1750   12/02/24  1742        Albumin       3.8         ALP       65         ALT       20         Anion Gap 13       10         Appearance, UA   Clear             AST       25         Baso # 0.03       0.03         Basophil % 0.5       0.3         Bilirubin (UA)   Negative             BILIRUBIN TOTAL       0.5  Comment: For infants and newborns, interpretation of results should be based  on gestational age, weight and in agreement with clinical  observations.    Premature Infant recommended reference ranges:  Up to 24 hours.............<8.0 mg/dL  Up to 48 hours............<12.0 mg/dL  3-5  days..................<15.0 mg/dL  6-29 days.................<15.0 mg/dL           BNP       106  Comment: Values of less than 100 pg/ml are consistent with non-CHF populations.         BUN 15       17         Calcium 8.5       9.0         Chloride 106       105         CO2 21       24         Color, UA   Yellow             Creatinine 0.8       0.9         Differential Method Automated       Automated         eGFR >60.0       >60.0         Eos # 0.1       0.0         Eos % 0.8       0.1         Glucose 91       118         Glucose, UA   Negative             Gran # (ANC) 3.8       7.4         Gran % 62.2       84.2         Hematocrit 35.8       38.7         Hemoglobin 12.2       12.7         Immature Grans (Abs) 0.02  Comment: Mild elevation in immature granulocytes is non specific and   can be seen in a variety of conditions including stress response,   acute inflammation, trauma and pregnancy. Correlation with other   laboratory and clinical findings is essential.         0.03  Comment: Mild elevation in immature granulocytes is non specific and   can be seen in a variety of conditions including stress response,   acute inflammation, trauma and pregnancy. Correlation with other   laboratory and clinical findings is essential.           Immature Granulocytes 0.3       0.3         Ketones, UA   2+             Leukocyte Esterase, UA   Negative             Lymph # 1.7       0.8         Lymph % 28.8       9.4         MCH 33.9       33.2         MCHC 34.1       32.8         MCV 99       101         Mono # 0.5       0.5         Mono % 7.4       5.7         MPV 9.9       9.6         NITRITE UA   Negative             nRBC 0       0         Blood, UA   Negative             QRS Duration         76       [facility] QTC Calculation         463       pH, UA   6.0             Platelet Count 196       199         Potassium 3.7       3.8         PROTEIN TOTAL       7.0         Protein, UA   Negative  Comment: Recommend a 24 hour urine  protein or a urine   protein/creatinine ratio if globulin induced proteinuria is  clinically suspected.               RBC 3.60       3.82         RDW 12.6       12.6         Sodium 140       139         Spec Grav UA   1.020             Specimen UA   Urine, Clean Catch             Troponin I     <0.006  Comment: The reference interval for Troponin I represents the 99th percentile   cutoff   for our facility and is consistent with 3rd generation assay   performance.     0.015  Comment: The reference interval for Troponin I represents the 99th percentile   cutoff   for our facility and is consistent with 3rd generation assay   performance.           WBC 6.05       8.80                                Significant Imaging: CT scan: Head reviewed and EKG: reviewed  Assessment and Plan:     Syncope  Syncope while sitting with observed diaphoresis and return to consciousness.   Had a previous episode of syncope in July during a particularly hot day watching a sports game. Also with fall 11/28 she hit the right side of her face while exiting the shower. In the ED US carotid and CT head wnl.  and Trop wnl. EKG sinus. No leukocytosis. No hypotension.  UA with +2 ketones, glucose 118. Electrolytes wnl. Pt states she is baseline and doing well in ED. Less likely hypoglycemia due to normal glucose levels and self reported good PO intake though UA did have +2 ketones.     129/60 Sitting  140/65 Lying  Ddx: vasovagal, bradyarrhythmia, neurological causes seizure vs alzheimer infusion adverse effect    - MRI head  - Neuro consult  - EP consult   - FU echocardiogram        VTE Risk Mitigation (From admission, onward)      None            Thank you for your consult. I will follow-up with patient. Please contact us if you have any additional questions.    Mathew Harris MD  Cardiology   [facility]

## 2024-12-03 NOTE — SUBJECTIVE & OBJECTIVE
History reviewed. No pertinent past medical history.    Past Surgical History:   Procedure Laterality Date    BREAST SURGERY       SECTION      melanoma Right     arm, [facility]       Review of patient's allergies indicates:   Allergen Reactions    Codeine Nausea And Vomiting    Shellfish containing products Nausea And Vomiting       No current facility-administered medications on file prior to encounter.     Current Outpatient Medications on File Prior to Encounter   Medication Sig    donepeziL (ARICEPT) 10 MG tablet Take 10 mg by mouth once daily.    aspirin (ECOTRIN) 325 MG EC tablet Take 1 tablet (325 mg total) by mouth 2 (two) times daily.    calcium citrate-vitamin D3 315-200 mg (CITRACAL+D) 315-200 mg-unit per tablet Take 1 tablet by mouth 2 (two) times daily.    hydrocodone-acetaminophen 10-325mg (NORCO)  mg Tab Take 1 tablet by mouth every 4 to 6 hours as needed.    tramadol (ULTRAM) 50 mg tablet Take 1 tablet (50 mg total) by mouth every 4 to 6 hours as needed for Pain.     Family History    None       Tobacco Use    Smoking status: Former    Smokeless tobacco: Not on file   Substance and Sexual Activity    Alcohol use: No    Drug use: Not on file    Sexual activity: Not on file     ROS  Review of Systems  Constitution: Denies chills, fever, and sweats.  HENT: Denies headaches or blurry vision.  Cardiovascular: Denies chest pain or irregular heart beat.  Respiratory: Denies cough or shortness of breath.  Gastrointestinal: Denies abdominal pain, nausea, or vomiting.  Musculoskeletal: Denies muscle cramps.  Neurological: Denies dizziness or focal weakness.  Psychiatric/Behavioral: Normal mental status.  Hematologic/Lymphatic: Denies bleeding problem or easy bruising/bleeding.  Skin: Denies rash or suspicious lesions     Objective:     Vital Signs (Most Recent):  Temp: 97.9 °F (36.6 °C) (24 1102)  Pulse: 67 (24 1102)  Resp: 16 (24 1102)  BP: 129/60 (24 1102)  SpO2: 95 %  (12/03/24 1102) Vital Signs (24h Range):  Temp:  [97.9 °F (36.6 °C)-98.2 °F (36.8 °C)] 97.9 °F (36.6 °C)  Pulse:  [67-83] 67  Resp:  [15-20] 16  SpO2:  [95 %-100 %] 95 %  BP: (121-178)/(60-80) 129/60     Weight: 59 kg (130 lb 1.1 oz)  Body mass index is 20.99 kg/m².    SpO2: 95 %         Intake/Output Summary (Last 24 hours) at 12/3/2024 1434  Last data filed at 12/3/2024 0115  Gross per 24 hour   Intake --   Output 200 ml   Net -200 ml       Lines/Drains/Airways       Peripheral Intravenous Line  Duration                  Peripheral IV - Single Lumen 12/02/24 20 G Right Antecubital 1 day                     Physical Exam  Constitutional:       Appearance: Normal appearance. She is not ill-appearing.   HENT:      Head:      Comments: ecchymosis around the right eye  Eyes:      General:         Right eye: No discharge.         Left eye: No discharge.   Cardiovascular:      Rate and Rhythm: Normal rate and regular rhythm.      Pulses: Normal pulses.      Heart sounds: Normal heart sounds.   Pulmonary:      Effort: Pulmonary effort is normal.      Breath sounds: Normal breath sounds.   Abdominal:      General: Abdomen is flat.      Palpations: Abdomen is soft.      Tenderness: There is no abdominal tenderness.   Musculoskeletal:      Right lower leg: No edema.      Left lower leg: No edema.   Skin:     General: Skin is warm and dry.   Neurological:      Mental Status: She is alert. Mental status is at baseline.   Psychiatric:         Mood and Affect: Mood normal.         Behavior: Behavior normal.         Thought Content: Thought content normal.         Judgment: Judgment normal.          Significant Labs:   Recent Lab Results  (Last 5 results in the past 24 hours)        12/03/24  1206   12/03/24  0412   12/03/24  0049   12/02/24 2032 12/02/24  1750        Albumin         3.8       ALP         65       ALT         20       Anion Gap   13       10       Ascending aorta 3.47               Ao peak leny 0.7                Ao VTI 17.6               Appearance, UA     Clear           AST         25       AV valve area 2.7               JOHN by Velocity Ratio 3.1               AV area by cont VTI 2.6               AV mean gradient 1.7               AV index (prosthetic) 0.85               LVOT mn grad 1               AV LVOT peak gradient 2               AV peak gradient 2.0               AV Velocity Ratio 1.00               Baso #   0.03       0.03       Basophil %   0.5       0.3       Bilirubin (UA)     Negative           BILIRUBIN TOTAL         0.5  Comment: For infants and newborns, interpretation of results should be based  on gestational age, weight and in agreement with clinical  observations.    Premature Infant recommended reference ranges:  Up to 24 hours.............<8.0 mg/dL  Up to 48 hours............<12.0 mg/dL  3-5 days..................<15.0 mg/dL  6-29 days.................<15.0 mg/dL         BNP         106  Comment: Values of less than 100 pg/ml are consistent with non-CHF populations.       BSA 1.66               BUN   15       17       Calcium   8.5       9.0       Chloride   106       105       CO2   21       24       Color, UA     Yellow           Creatinine   0.8       0.9       Left Ventricle Relative Wall Thickness 0.37               Differential Method   Automated       Automated       E/A ratio 0.62               Echo EF Estimated 72               E/E' ratio 8.40               eGFR   >60.0       >60.0       Eos #   0.1       0.0       Eos %   0.8       0.1       E wave deceleration time 132.80               FS 39.5               Glucose   91       118       Glucose, UA     Negative           Gran # (ANC)   3.8       7.4       Gran %   62.2       84.2       Hematocrit   35.8       38.7       Hemoglobin   12.2       12.7       Immature Grans (Abs)   0.02  Comment: Mild elevation in immature granulocytes is non specific and   can be seen in a variety of conditions including stress response,   acute  inflammation, trauma and pregnancy. Correlation with other   laboratory and clinical findings is essential.         0.03  Comment: Mild elevation in immature granulocytes is non specific and   can be seen in a variety of conditions including stress response,   acute inflammation, trauma and pregnancy. Correlation with other   laboratory and clinical findings is essential.         Immature Granulocytes   0.3       0.3       IVSd 0.9               Ketones, UA     2+           LA WIDTH 3.54               Left Atrium Major Axis 3.72               Left Atrium Minor Axis 3.18               LA size 2.93               LA Vol 30.23               LA vol index 18.1               LVOT area 3.1               Leukocyte Esterase, UA     Negative           LV LATERAL E/E' RATIO 7.00               LV SEPTAL E/E' RATIO 10.50               LV EDV BP 63.61               LV Diastolic Volume Index 38.09               LVIDd 3.8               LVIDs 2.3               LV mass 86.0               LV Mass Index 51.5               LVOT diameter 2.0               LVOT peak gabe 0.7               LVOT stroke volume 47.1               LVOT peak VTI 15.0               LV ESV BP 18.04               LV Systolic Volume Index 10.8               Lymph #   1.7       0.8       Lymph %   28.8       9.4       MCH   33.9       33.2       MCHC   34.1       32.8       MCV   99       101       Mean e' 0.05               Mono #   0.5       0.5       Mono %   7.4       5.7       MPV   9.9       9.6       MV Peak A Gabe 0.68               MV Peak E Gabe 0.42               NITRITE UA     Negative           nRBC   0       0       Blood, UA     Negative           pH, UA     6.0           Platelet Count   196       199       Potassium   3.7       3.8       PROTEIN TOTAL         7.0       Protein, UA     Negative  Comment: Recommend a 24 hour urine protein or a urine   protein/creatinine ratio if globulin induced proteinuria is  clinically suspected.             PW 0.7                RBC   3.60       3.82       RDW   12.6       12.6       RV S' 15.45               RV/LV Ratio 0.97               RV- rojas basal diam 3.7               Sinus 3.26               Sodium   140       139       Spec Grav UA     1.020           Specimen UA     Urine, Clean Catch           STJ 2.80               TAPSE 3.03               TDI SEPTAL 0.04               TDI LATERAL 0.06               Triscuspid Valve Regurgitation Peak Gradient 26               TR Max Gabe 2.57               Troponin I       <0.006  Comment: The reference interval for Troponin I represents the 99th percentile   cutoff   for our facility and is consistent with 3rd generation assay   performance.     0.015  Comment: The reference interval for Troponin I represents the 99th percentile   cutoff   for our facility and is consistent with 3rd generation assay   performance.         TV PG 26               WBC   6.05       8.80       ZLVIDD -2.06               ZLVIDS -1.81                                      Significant Imaging: CT scan: Head reviewed and EKG: reviewed  TTE Echo 12/3/24    Left Ventricle: The left ventricle is normal in size. Normal wall thickness. There is normal systolic function with a visually estimated ejection fraction of 60 - 65%. There is normal diastolic function.    Right Ventricle: Normal right ventricular cavity size. Wall thickness is normal. Systolic function is normal.    Aortic Valve: The aortic valve is a trileaflet valve. There is mild aortic valve sclerosis.    Tricuspid Valve: There is mild regurgitation.    Pulmonary Artery: The estimated pulmonary artery systolic pressure is at least 29 mmHg

## 2024-12-03 NOTE — HPI
Parvin Sommer Engel is a 79 y.o. female with a history of Alzheimer's dementia who [place] [facility] [place] for emergent evaluation of syncope.  As per the daughter-in-law bedside reported she was waiting to board a flight to [place] and suddenly passed out.  Patient denied any lightheadedness, palpitations shortness or chest pain prior to this event.  Daughter in law also reported she slipped in bathroom 3 days ago but no cardiac etiology was identified. History of similar episode passing out in the summer around August 2024, bus eh attributes that to dehydration in hot summer day.  The daughter in law is the Urologist at [facility] (Dr. Engel), she reported patient does not take any beta blockers or any other AV starla blocking agents.  Episodes of syncope are without any prior prodrome of symptoms. Electrophysiology has been consulted for evaluation of  syncope.

## 2024-12-03 NOTE — SUBJECTIVE & OBJECTIVE
History reviewed. No pertinent past medical history.    Past Surgical History:   Procedure Laterality Date    BREAST SURGERY       SECTION      melanoma Right     arm, [facility]       Review of patient's allergies indicates:   Allergen Reactions    Codeine Nausea And Vomiting    Shellfish containing products Nausea And Vomiting       No current facility-administered medications on file prior to encounter.     Current Outpatient Medications on File Prior to Encounter   Medication Sig    donepeziL (ARICEPT) 10 MG tablet Take 10 mg by mouth once daily.    aspirin (ECOTRIN) 325 MG EC tablet Take 1 tablet (325 mg total) by mouth 2 (two) times daily.    calcium citrate-vitamin D3 315-200 mg (CITRACAL+D) 315-200 mg-unit per tablet Take 1 tablet by mouth 2 (two) times daily.    hydrocodone-acetaminophen 10-325mg (NORCO)  mg Tab Take 1 tablet by mouth every 4 to 6 hours as needed.    tramadol (ULTRAM) 50 mg tablet Take 1 tablet (50 mg total) by mouth every 4 to 6 hours as needed for Pain.     Family History    None       Tobacco Use    Smoking status: Former    Smokeless tobacco: Not on file   Substance and Sexual Activity    Alcohol use: No    Drug use: Not on file    Sexual activity: Not on file     ROS  Objective:     Vital Signs (Most Recent):  Temp: 97.9 °F (36.6 °C) (24 1102)  Pulse: 67 (24 1102)  Resp: 16 (24 1102)  BP: 129/60 (24 1102)  SpO2: 95 % (24 1102) Vital Signs (24h Range):  Temp:  [97.9 °F (36.6 °C)-98.2 °F (36.8 °C)] 97.9 °F (36.6 °C)  Pulse:  [67-83] 67  Resp:  [15-20] 16  SpO2:  [95 %-100 %] 95 %  BP: (121-178)/(60-80) 129/60     Weight: 59 kg (130 lb 1.1 oz)  Body mass index is 20.99 kg/m².    SpO2: 95 %         Intake/Output Summary (Last 24 hours) at 12/3/2024 1148  Last data filed at 12/3/2024 0115  Gross per 24 hour   Intake --   Output 200 ml   Net -200 ml       Lines/Drains/Airways       Peripheral Intravenous Line  Duration                  Peripheral IV -  Single Lumen 12/02/24 20 G Right Antecubital 1 day                     Physical Exam  Constitutional:       Appearance: Normal appearance. She is not ill-appearing.   HENT:      Head:      Comments: ecchymosis around the right eye  Eyes:      General:         Right eye: No discharge.         Left eye: No discharge.   Cardiovascular:      Rate and Rhythm: Normal rate and regular rhythm.      Pulses: Normal pulses.      Heart sounds: Normal heart sounds.   Pulmonary:      Effort: Pulmonary effort is normal.      Breath sounds: Normal breath sounds.   Abdominal:      General: Abdomen is flat.      Palpations: Abdomen is soft.      Tenderness: There is no abdominal tenderness.   Musculoskeletal:      Right lower leg: No edema.      Left lower leg: No edema.   Skin:     General: Skin is warm and dry.   Neurological:      Mental Status: She is alert. Mental status is at baseline.   Psychiatric:         Mood and Affect: Mood normal.         Behavior: Behavior normal.         Thought Content: Thought content normal.         Judgment: Judgment normal.          Significant Labs:   Recent Lab Results  (Last 5 results in the past 24 hours)        12/03/24  0412   12/03/24  0049   12/02/24  2032   12/02/24  1750   12/02/24  1742        Albumin       3.8         ALP       65         ALT       20         Anion Gap 13       10         Appearance, UA   Clear             AST       25         Baso # 0.03       0.03         Basophil % 0.5       0.3         Bilirubin (UA)   Negative             BILIRUBIN TOTAL       0.5  Comment: For infants and newborns, interpretation of results should be based  on gestational age, weight and in agreement with clinical  observations.    Premature Infant recommended reference ranges:  Up to 24 hours.............<8.0 mg/dL  Up to 48 hours............<12.0 mg/dL  3-5 days..................<15.0 mg/dL  6-29 days.................<15.0 mg/dL           BNP       106  Comment: Values of less than 100 pg/ml  are consistent with non-CHF populations.         BUN 15       17         Calcium 8.5       9.0         Chloride 106       105         CO2 21       24         Color, UA   Yellow             Creatinine 0.8       0.9         Differential Method Automated       Automated         eGFR >60.0       >60.0         Eos # 0.1       0.0         Eos % 0.8       0.1         Glucose 91       118         Glucose, UA   Negative             Gran # (ANC) 3.8       7.4         Gran % 62.2       84.2         Hematocrit 35.8       38.7         Hemoglobin 12.2       12.7         Immature Grans (Abs) 0.02  Comment: Mild elevation in immature granulocytes is non specific and   can be seen in a variety of conditions including stress response,   acute inflammation, trauma and pregnancy. Correlation with other   laboratory and clinical findings is essential.         0.03  Comment: Mild elevation in immature granulocytes is non specific and   can be seen in a variety of conditions including stress response,   acute inflammation, trauma and pregnancy. Correlation with other   laboratory and clinical findings is essential.           Immature Granulocytes 0.3       0.3         Ketones, UA   2+             Leukocyte Esterase, UA   Negative             Lymph # 1.7       0.8         Lymph % 28.8       9.4         MCH 33.9       33.2         MCHC 34.1       32.8         MCV 99       101         Mono # 0.5       0.5         Mono % 7.4       5.7         MPV 9.9       9.6         NITRITE UA   Negative             nRBC 0       0         Blood, UA   Negative             QRS Duration         76       [facility] QTC Calculation         463       pH, UA   6.0             Platelet Count 196       199         Potassium 3.7       3.8         PROTEIN TOTAL       7.0         Protein, UA   Negative  Comment: Recommend a 24 hour urine protein or a urine   protein/creatinine ratio if globulin induced proteinuria is  clinically suspected.               RBC 3.60       3.82          RDW 12.6       12.6         Sodium 140       139         Spec Grav UA   1.020             Specimen UA   Urine, Clean Catch             Troponin I     <0.006  Comment: The reference interval for Troponin I represents the 99th percentile   cutoff   for our facility and is consistent with 3rd generation assay   performance.     0.015  Comment: The reference interval for Troponin I represents the 99th percentile   cutoff   for our facility and is consistent with 3rd generation assay   performance.           WBC 6.05       8.80                                Significant Imaging: CT scan: Head reviewed and EKG: reviewed

## 2024-12-03 NOTE — PLAN OF CARE
[facility] - Emergency Dept  Discharge Reassessment    Primary Care Provider: No, Primary Doctor    Expected Discharge Date:     SW spoke with patient son Wilder [phone] to discuss pt d/c.  As per son pt lives independently and splits her time between [place] and [place].  Per son, pt is independent with her ambulation and ADL's and does not require assistance or equipment.  Per son pt was visiting [place] and will return to son Wilder's home on d/c.      Pt to d/c home with family with no needs when medically ready     Reassessment (most recent)       Discharge Reassessment - 12/03/24 7825          Discharge Reassessment    Assessment Type Discharge Planning Reassessment (P)      Did the patient's condition or plan change since previous assessment? No (P)      Discharge Plan discussed with: Adult children (P)      Communicated JOLEEN with patient/caregiver Yes (P)      Discharge Plan A Home with family;Home (P)      Discharge Plan B Home;Home with family (P)      DME Needed Upon Discharge  none (P)      Transition of Care Barriers None (P)         Post-Acute Status    Post-Acute Authorization Other (P)      Other Status No Post-Acute Service Needs (P)      Discharge Delays None known at this time (P)                    Keisha Hickey, MARJORIE, MSW, LMSW, RSW   Case Management  [facility]  Email: [email or web address]

## 2024-12-03 NOTE — ED NOTES
Orthostatics:    Laying - /66                Pulse 66    Sitting down - /72                         Pulse 69    Standing - /70                    Pulse 80

## 2024-12-03 NOTE — ED NOTES
Assumed care for pt after recieving report from JUNE Restrepo. Pt. resting in bed in NAD, RR e/u. Pt. offered bathroom assistance and denies need at this time. Explanation of care/wait provided. Pt verbalizes no needs at this time. Bed in low, locked position with rails up and call bell in reach. Pt's white board updated with today's care team and plan.

## 2024-12-03 NOTE — ASSESSMENT & PLAN NOTE
Syncope while sitting with observed diaphoresis and return to consciousness.   Had a previous episode of syncope in July during a particularly hot day watching a sports game. Also with fall 11/28 she hit the right side of her face while exiting the shower. In the ED US carotid and CT head wnl.  and Trop wnl. EKG sinus. No leukocytosis. No hypotension.  UA with +2 ketones, glucose 118. Electrolytes wnl. Pt states she is baseline and doing well in ED. Less likely hypoglycemia due to normal glucose levels and self reported good PO intake though UA did have +2 ketones.     129/60 Sitting  140/65 Lying  Ddx: vasovagal, bradyarrhythmia, neurological causes seizure vs alzheimer infusion adverse effect    - MRI head  - Neuro consult  - EP consult   - FU echocardiogram

## 2024-12-03 NOTE — H&P
ED Observation Unit  History and Physical      I assumed care of this patient from the Main ED at onset of observation time,  on 2024.       History of Present Illness:    Parvin Engel is a 79 y.o. female with a history of Alzheimer's dementia who [place] [facility] [place]24 for emergent evaluation of syncope today.     Patient was at the airport waiting to board a flight to return home after visiting her son and daughter-in-law, who are both [employer] physicians.  Family member who accompanied her states that she passed out while seated in the gate waiting area for her flight.  She is alert and oriented to person, , and situation, but not year or location. The patient denies prodrome symptoms prior to syncope today. She had felt well today before this occurred.  Her son states that she slipped in the shower 3 days ago and went to [facility] ED where she had a head CT that was negative.  She has not fallen since.  She has some bruising on her eye that was from that fall. Son also mentions a similar episode over the summer while she pasted out at the park. They have also noticed patient having episodes of clamminess with exertion in the past, but none recently. She did eat lunch today. She denies neck pain, back pain, chest pain, SOB, abd pain, extremity weakness, paresthesias, history of arrhythmias, MIs, HF, DVT/PE, hormone use, tobacco use, or cancer. She also denies infections symptoms such as cough, nausea, vomiting, diarrhea, dysuria, frequency.    In the ED, ECG with NSR. Nonspecific T wave changes noted. Normal intervals. No STEMI. Labs without signs of leukocytosis, anemia, electrolyte abn, or KADE. Glucose 118. Normal LFTs. 1st troponin 0.015. .     I reviewed the ED Provider Note dated 2024 prior to my evaluation of this patient.  I reviewed all labs and imaging performed in the Main ED, prior to patient being placed in Observation. Patient was placed in the ED Observation  "Unit for syncope.     PMHx   History reviewed. No pertinent past medical history.   Past Surgical History:   Procedure Laterality Date    BREAST SURGERY       SECTION      melanoma Right     arm, 2001ish        Family Hx   No family history on file.     Social Hx   Social History     Socioeconomic History    Marital status:    Tobacco Use    Smoking status: Former   Substance and Sexual Activity    Alcohol use: No        Vital Signs   Vitals:    24 1653 24 1750 24 1900 24   BP: 130/80 (!) 178/75 (!) 158/71 (!) 158/70   BP Location:  Right arm     Pulse: 80 76 76 83   Resp: 18 18 15 17   Temp: 98.2 °F (36.8 °C) 98 °F (36.7 °C)     TempSrc: Oral Oral     SpO2: 97% 100% 99% 99%   Weight: 59 kg (130 lb) 59 kg (130 lb 1.1 oz)     Height:  5' 6" (1.676 m)          Review of Systems  Review of Systems   Constitutional:  Negative for chills, diaphoresis, fever and malaise/fatigue.   HENT:  Negative for congestion and sore throat.    Eyes:  Negative for double vision.   Respiratory:  Negative for cough, shortness of breath and wheezing.    Cardiovascular:  Negative for chest pain, palpitations, orthopnea, leg swelling and PND.   Gastrointestinal:  Negative for abdominal pain, blood in stool, constipation, heartburn, nausea and vomiting.   Genitourinary:  Negative for frequency and hematuria.   Musculoskeletal:  Negative for back pain, falls, myalgias and neck pain.   Neurological:  Positive for loss of consciousness. Negative for dizziness, weakness and headaches.   Psychiatric/Behavioral:  The patient is not nervous/anxious.        Physical Exam  Physical Exam  Constitutional:       General: She is not in acute distress.     Appearance: She is not ill-appearing, toxic-appearing or diaphoretic.   HENT:      Head: Contusion present. No raccoon eyes.        Right Ear: External ear normal.      Left Ear: External ear normal.      Nose: Nose normal.   Eyes:      General: No scleral " icterus.     Extraocular Movements: Extraocular movements intact.      Right eye: No nystagmus.      Left eye: No nystagmus.      Conjunctiva/sclera:      Right eye: Right conjunctiva is not injected.   Cardiovascular:      Rate and Rhythm: Normal rate and regular rhythm.   Pulmonary:      Effort: No respiratory distress.      Breath sounds: No wheezing.   Abdominal:      General: There is no distension.   Musculoskeletal:         General: Normal range of motion.      Cervical back: Normal range of motion. No rigidity. No pain with movement. Normal range of motion.      Right lower leg: No edema.      Comments: FROM and 5/5 strength throughout. Sensations grossly intact. Able to bear weight and ambulate without assistance.   Skin:     Coloration: Skin is not jaundiced.      Findings: No rash.   Neurological:      General: No focal deficit present.      Mental Status: She is alert. Mental status is at baseline.      Comments: Alert and oriented to person, , and situation, but not year or location.         Medications:   Scheduled Meds:   donepeziL  5 mg Oral QHS     Continuous Infusions:  PRN Meds:.  Current Facility-Administered Medications:     acetaminophen, 1,000 mg, Oral, Q8H PRN    metoclopramide, 10 mg, Intravenous, Q6H PRN    ondansetron, 4 mg, Intravenous, Q6H PRN      Assessment/Plan:    Syncope:  -In the ED, ECG with NSR. Nonspecific T wave changes noted. Normal intervals. No STEMI. Labs without signs of leukocytosis, anemia, electrolyte abn, or KADE. Glucose 118. Normal LFTs. 1st troponin 0.015. .   -No prodromal symptoms today with episode. History of syncope over the summer and symptoms with exertion in the past.  -CT head and US carotid ordered  -Check UA  -ECHO tomorrow  -Tele  -Repeat AM labs      Case was discussed with the ED provider, Dr. Dias.

## 2024-12-03 NOTE — CONSULTS
[facility] - Emergency Dept  Cardiac Electrophysiology  Consult Note    Admission Date: 2024  Code Status: Full Code   Attending Provider: No att. providers found  Consulting Provider: Bobo Mullins MD  Principal Problem:<principal problem not specified>    Inpatient consult to Electrophysiology  Consult performed by: Bobo Mullins MD  Consult ordered by: Vincenzo Fuentes MD  Reason for consult: evaluation of  syncope        Subjective:     HPI:   Parvin Engel is a 79 y.o. female with a history of Alzheimer's dementia who presents to [facility] ED on 2024 for emergent evaluation of syncope.  As per the daughter-in-law bedside reported she was waiting to board a flight to [place] and suddenly passed out.  Patient denied any lightheadedness, palpitations shortness or chest pain prior to this event.  Daughter in law also reported she slipped in bathroom 3 days ago but no cardiac etiology was identified. History of similar episode passing out in the summer around 2024, bus eh attributes that to dehydration in hot summer day.  The daughter in law is the Urologist at [facility] (Dr. Engel), she reported patient does not take any beta blockers or any other AV starla blocking agents.  Episodes of syncope are without any prior prodrome of symptoms. Electrophysiology has been consulted for evaluation of  syncope.        History reviewed. No pertinent past medical history.    Past Surgical History:   Procedure Laterality Date    BREAST SURGERY       SECTION      melanoma Right     arm, ish       Review of patient's allergies indicates:   Allergen Reactions    Codeine Nausea And Vomiting    Shellfish containing products Nausea And Vomiting       No current facility-administered medications on file prior to encounter.     Current Outpatient Medications on File Prior to Encounter   Medication Sig    donepeziL (ARICEPT) 10 MG tablet Take 10 mg by mouth once daily.    aspirin (ECOTRIN) 325  MG EC tablet Take 1 tablet (325 mg total) by mouth 2 (two) times daily.    calcium citrate-vitamin D3 315-200 mg (CITRACAL+D) 315-200 mg-unit per tablet Take 1 tablet by mouth 2 (two) times daily.    hydrocodone-acetaminophen 10-325mg (NORCO)  mg Tab Take 1 tablet by mouth every 4 to 6 hours as needed.    tramadol (ULTRAM) 50 mg tablet Take 1 tablet (50 mg total) by mouth every 4 to 6 hours as needed for Pain.     Family History    None       Tobacco Use    Smoking status: Former    Smokeless tobacco: Not on file   Substance and Sexual Activity    Alcohol use: No    Drug use: Not on file    Sexual activity: Not on file     ROS  Review of Systems  Constitution: Denies chills, fever, and sweats.  HENT: Denies headaches or blurry vision.  Cardiovascular: Denies chest pain or irregular heart beat.  Respiratory: Denies cough or shortness of breath.  Gastrointestinal: Denies abdominal pain, nausea, or vomiting.  Musculoskeletal: Denies muscle cramps.  Neurological: Denies dizziness or focal weakness.  Psychiatric/Behavioral: Normal mental status.  Hematologic/Lymphatic: Denies bleeding problem or easy bruising/bleeding.  Skin: Denies rash or suspicious lesions     Objective:     Vital Signs (Most Recent):  Temp: 97.9 °F (36.6 °C) (12/03/24 1102)  Pulse: 67 (12/03/24 1102)  Resp: 16 (12/03/24 1102)  BP: 129/60 (12/03/24 1102)  SpO2: 95 % (12/03/24 1102) Vital Signs (24h Range):  Temp:  [97.9 °F (36.6 °C)-98.2 °F (36.8 °C)] 97.9 °F (36.6 °C)  Pulse:  [67-83] 67  Resp:  [15-20] 16  SpO2:  [95 %-100 %] 95 %  BP: (121-178)/(60-80) 129/60     Weight: 59 kg (130 lb 1.1 oz)  Body mass index is 20.99 kg/m².    SpO2: 95 %         Intake/Output Summary (Last 24 hours) at 12/3/2024 1434  Last data filed at 12/3/2024 0115  Gross per 24 hour   Intake --   Output 200 ml   Net -200 ml       Lines/Drains/Airways       Peripheral Intravenous Line  Duration                  Peripheral IV - Single Lumen 12/02/24 20 G Right Antecubital  1 day                     Physical Exam  Constitutional:       Appearance: Normal appearance. She is not ill-appearing.   HENT:      Head:      Comments: ecchymosis around the right eye  Eyes:      General:         Right eye: No discharge.         Left eye: No discharge.   Cardiovascular:      Rate and Rhythm: Normal rate and regular rhythm.      Pulses: Normal pulses.      Heart sounds: Normal heart sounds.   Pulmonary:      Effort: Pulmonary effort is normal.      Breath sounds: Normal breath sounds.   Abdominal:      General: Abdomen is flat.      Palpations: Abdomen is soft.      Tenderness: There is no abdominal tenderness.   Musculoskeletal:      Right lower leg: No edema.      Left lower leg: No edema.   Skin:     General: Skin is warm and dry.   Neurological:      Mental Status: She is alert. Mental status is at baseline.   Psychiatric:         Mood and Affect: Mood normal.         Behavior: Behavior normal.         Thought Content: Thought content normal.         Judgment: Judgment normal.          Significant Labs:   Recent Lab Results  (Last 5 results in the past 24 hours)        12/03/24  1206   12/03/24  0412   12/03/24  0049   12/02/24  2032   12/02/24  1750        Albumin         3.8       ALP         65       ALT         20       Anion Gap   13       10       Ascending aorta 3.47               Ao peak leny 0.7               Ao VTI 17.6               Appearance, UA     Clear           AST         25       AV valve area 2.7               JOHN by Velocity Ratio 3.1               AV area by cont VTI 2.6               AV mean gradient 1.7               AV index (prosthetic) 0.85               LVOT mn grad 1               AV LVOT peak gradient 2               AV peak gradient 2.0               AV Velocity Ratio 1.00               Baso #   0.03       0.03       Basophil %   0.5       0.3       Bilirubin (UA)     Negative           BILIRUBIN TOTAL         0.5  Comment: For infants and newborns, interpretation  of results should be based  on gestational age, weight and in agreement with clinical  observations.    Premature Infant recommended reference ranges:  Up to 24 hours.............<8.0 mg/dL  Up to 48 hours............<12.0 mg/dL  3-5 days..................<15.0 mg/dL  6-29 days.................<15.0 mg/dL         BNP         106  Comment: Values of less than 100 pg/ml are consistent with non-CHF populations.       BSA 1.66               BUN   15       17       Calcium   8.5       9.0       Chloride   106       105       CO2   21       24       Color, UA     Yellow           Creatinine   0.8       0.9       Left Ventricle Relative Wall Thickness 0.37               Differential Method   Automated       Automated       E/A ratio 0.62               Echo EF Estimated 72               E/E' ratio 8.40               eGFR   >60.0       >60.0       Eos #   0.1       0.0       Eos %   0.8       0.1       E wave deceleration time 132.80               FS 39.5               Glucose   91       118       Glucose, UA     Negative           Gran # (ANC)   3.8       7.4       Gran %   62.2       84.2       Hematocrit   35.8       38.7       Hemoglobin   12.2       12.7       Immature Grans (Abs)   0.02  Comment: Mild elevation in immature granulocytes is non specific and   can be seen in a variety of conditions including stress response,   acute inflammation, trauma and pregnancy. Correlation with other   laboratory and clinical findings is essential.         0.03  Comment: Mild elevation in immature granulocytes is non specific and   can be seen in a variety of conditions including stress response,   acute inflammation, trauma and pregnancy. Correlation with other   laboratory and clinical findings is essential.         Immature Granulocytes   0.3       0.3       IVSd 0.9               Ketones, UA     2+           LA WIDTH 3.54               Left Atrium Major Axis 3.72               Left Atrium Minor Axis 3.18               LA size  2.93               LA Vol 30.23               LA vol index 18.1               LVOT area 3.1               Leukocyte Esterase, UA     Negative           LV LATERAL E/E' RATIO 7.00               LV SEPTAL E/E' RATIO 10.50               LV EDV BP 63.61               LV Diastolic Volume Index 38.09               LVIDd 3.8               LVIDs 2.3               LV mass 86.0               LV Mass Index 51.5               LVOT diameter 2.0               LVOT peak gabe 0.7               LVOT stroke volume 47.1               LVOT peak VTI 15.0               LV ESV BP 18.04               LV Systolic Volume Index 10.8               Lymph #   1.7       0.8       Lymph %   28.8       9.4       MCH   33.9       33.2       MCHC   34.1       32.8       MCV   99       101       Mean e' 0.05               Mono #   0.5       0.5       Mono %   7.4       5.7       MPV   9.9       9.6       MV Peak A Gabe 0.68               MV Peak E Gabe 0.42               NITRITE UA     Negative           nRBC   0       0       Blood, UA     Negative           pH, UA     6.0           Platelet Count   196       199       Potassium   3.7       3.8       PROTEIN TOTAL         7.0       Protein, UA     Negative  Comment: Recommend a 24 hour urine protein or a urine   protein/creatinine ratio if globulin induced proteinuria is  clinically suspected.             PW 0.7               RBC   3.60       3.82       RDW   12.6       12.6       RV S' 15.45               RV/LV Ratio 0.97               RV- rojas basal diam 3.7               Sinus 3.26               Sodium   140       139       Spec Grav UA     1.020           Specimen UA     Urine, Clean Catch           STJ 2.80               TAPSE 3.03               TDI SEPTAL 0.04               TDI LATERAL 0.06               Triscuspid Valve Regurgitation Peak Gradient 26               TR Max Gabe 2.57               Troponin I       <0.006  Comment: The reference interval for Troponin I represents the 99th percentile    cutoff   for our facility and is consistent with 3rd generation assay   performance.     0.015  Comment: The reference interval for Troponin I represents the 99th percentile   cutoff   for our facility and is consistent with 3rd generation assay   performance.         TV PG 26               WBC   6.05       8.80       ZLVIDD -2.06               ZLVIDS -1.81                                      Significant Imaging: CT scan: Head reviewed and EKG: reviewed  TTE Echo 12/3/24    Left Ventricle: The left ventricle is normal in size. Normal wall thickness. There is normal systolic function with a visually estimated ejection fraction of 60 - 65%. There is normal diastolic function.    Right Ventricle: Normal right ventricular cavity size. Wall thickness is normal. Systolic function is normal.    Aortic Valve: The aortic valve is a trileaflet valve. There is mild aortic valve sclerosis.    Tricuspid Valve: There is mild regurgitation.    Pulmonary Artery: The estimated pulmonary artery systolic pressure is at least 29 mmHg              Assessment and Plan:     Syncope  Parvin Engel is a 79 y.o. female with a history of Alzheimer's dementia who presents to [facility] ED on 12/2/2024 for emergent evaluation of syncope. Electrophysiology has been consulted for evaluation of  syncope.       EKG 12/02/2024 normal sinus rhythm  EKG 12/2/16 normal sinus rhythm   CT head negative  Telemetry: Normal sinus rhythm  No any lab abnormalities  TTE 12/3/24 showed LVEF 60 - 65%. no other hemodynamically significant structural valvular abnormality.    Continue Telemetry monitoring   Get orthostatic vitals  In view of frequent episodes of syncope without any prodromal symptoms we recommend provocative testing either stress test or walking hallway to observe chronotropic response.   Recommend ambulatory 2-4 weeks cardiac event monitor   Adequate Hydration  We will monitor her overnight telemetry    Thank you for your consult.     Bobo  MD Harpreet  Cardiac Electrophysiology  [facility] - Emergency Dept

## 2024-12-04 ENCOUNTER — CLINICAL SUPPORT (OUTPATIENT)
Dept: CARDIOLOGY | Facility: HOSPITAL | Age: 79
End: 2024-12-04
Attending: EMERGENCY MEDICINE
Payer: MEDICARE

## 2024-12-04 VITALS
OXYGEN SATURATION: 98 % | RESPIRATION RATE: 18 BRPM | DIASTOLIC BLOOD PRESSURE: 73 MMHG | BODY MASS INDEX: 20.9 KG/M2 | TEMPERATURE: 98 F | HEART RATE: 79 BPM | WEIGHT: 130.06 LBS | SYSTOLIC BLOOD PRESSURE: 146 MMHG | HEIGHT: 66 IN

## 2024-12-04 DIAGNOSIS — R55 SYNCOPE: ICD-10-CM

## 2024-12-04 PROCEDURE — G0378 HOSPITAL OBSERVATION PER HR: HCPCS

## 2024-12-04 PROCEDURE — 99214 OFFICE O/P EST MOD 30 MIN: CPT | Mod: ,,, | Performed by: STUDENT IN AN ORGANIZED HEALTH CARE EDUCATION/TRAINING PROGRAM

## 2024-12-04 PROCEDURE — 95813 EEG EXTND MNTR 61-119 MIN: CPT

## 2024-12-04 PROCEDURE — 99213 OFFICE O/P EST LOW 20 MIN: CPT | Mod: ,,, | Performed by: INTERNAL MEDICINE

## 2024-12-04 PROCEDURE — 95813 EEG EXTND MNTR 61-119 MIN: CPT | Mod: 26,,, | Performed by: PSYCHIATRY & NEUROLOGY

## 2024-12-04 NOTE — SUBJECTIVE & OBJECTIVE
Interval History: No Acute Events Overnight . She was getting EEG today morning during our interactions with her. She denies any further syncopal episodes. Telemetry did not show any recording in airstrip, telemetry was disconnected overnight. After plugging in tele in EDOU showed it was normal sinus rhythm with HR in 70s.    History reviewed. No pertinent past medical history.    Past Surgical History:   Procedure Laterality Date    BREAST SURGERY       SECTION      melanoma Right     arm, ish       Review of patient's allergies indicates:   Allergen Reactions    Codeine Nausea And Vomiting    Shellfish containing products Nausea And Vomiting       No current facility-administered medications on file prior to encounter.     Current Outpatient Medications on File Prior to Encounter   Medication Sig    donepeziL (ARICEPT) 10 MG tablet Take 10 mg by mouth once daily.    aspirin (ECOTRIN) 325 MG EC tablet Take 1 tablet (325 mg total) by mouth 2 (two) times daily.    calcium citrate-vitamin D3 315-200 mg (CITRACAL+D) 315-200 mg-unit per tablet Take 1 tablet by mouth 2 (two) times daily.    [DISCONTINUED] hydrocodone-acetaminophen 10-325mg (NORCO)  mg Tab Take 1 tablet by mouth every 4 to 6 hours as needed.    [DISCONTINUED] tramadol (ULTRAM) 50 mg tablet Take 1 tablet (50 mg total) by mouth every 4 to 6 hours as needed for Pain.     Family History    None       Tobacco Use    Smoking status: Former    Smokeless tobacco: Not on file   Substance and Sexual Activity    Alcohol use: No    Drug use: Not on file    Sexual activity: Not on file     ROS  Review of Systems  Constitution: Denies chills, fever, and sweats.  HENT: Denies headaches or blurry vision.  Cardiovascular: Denies chest pain or irregular heart beat.  Respiratory: Denies cough or shortness of breath.  Gastrointestinal: Denies abdominal pain, nausea, or vomiting.  Musculoskeletal: Denies muscle cramps.  Neurological: Denies dizziness or  focal weakness.  Psychiatric/Behavioral: Normal mental status.  Hematologic/Lymphatic: Denies bleeding problem or easy bruising/bleeding.  Skin: Denies rash or suspicious lesions     Objective:     Vital Signs (Most Recent):  Temp: 97.8 °F (36.6 °C) (12/04/24 1236)  Pulse: 79 (12/04/24 1236)  Resp: 18 (12/04/24 1236)  BP: (!) 146/73 (12/04/24 1236)  SpO2: 98 % (12/04/24 1236) Vital Signs (24h Range):  Temp:  [97.8 °F (36.6 °C)-98.5 °F (36.9 °C)] 97.8 °F (36.6 °C)  Pulse:  [68-92] 79  Resp:  [15-18] 18  SpO2:  [95 %-98 %] 98 %  BP: (131-160)/(65-79) 146/73     Weight: 59 kg (130 lb 1.1 oz)  Body mass index is 20.99 kg/m².    SpO2: 98 %         Intake/Output Summary (Last 24 hours) at 12/4/2024 1330  Last data filed at 12/4/2024 0029  Gross per 24 hour   Intake 500 ml   Output --   Net 500 ml       Lines/Drains/Airways       None                    Physical Exam  Constitutional:       Appearance: Normal appearance. She is not ill-appearing.   HENT:      Head:      Comments: ecchymosis around the right eye  Eyes:      General:         Right eye: No discharge.         Left eye: No discharge.   Cardiovascular:      Rate and Rhythm: Normal rate and regular rhythm.      Pulses: Normal pulses.      Heart sounds: Normal heart sounds.   Pulmonary:      Effort: Pulmonary effort is normal.      Breath sounds: Normal breath sounds.   Abdominal:      General: Abdomen is flat.      Palpations: Abdomen is soft.      Tenderness: There is no abdominal tenderness.   Musculoskeletal:      Right lower leg: No edema.      Left lower leg: No edema.   Skin:     General: Skin is warm and dry.   Neurological:      Mental Status: She is alert. Mental status is at baseline.   Psychiatric:         Mood and Affect: Mood normal.         Behavior: Behavior normal.         Thought Content: Thought content normal.         Judgment: Judgment normal.          Significant Labs:   Recent Lab Results       None            Significant Imaging: CT scan:  Head reviewed and EKG: reviewed  TTE Echo 12/3/24    Left Ventricle: The left ventricle is normal in size. Normal wall thickness. There is normal systolic function with a visually estimated ejection fraction of 60 - 65%. There is normal diastolic function.    Right Ventricle: Normal right ventricular cavity size. Wall thickness is normal. Systolic function is normal.    Aortic Valve: The aortic valve is a trileaflet valve. There is mild aortic valve sclerosis.    Tricuspid Valve: There is mild regurgitation.    Pulmonary Artery: The estimated pulmonary artery systolic pressure is at least 29 mmHg

## 2024-12-04 NOTE — CONSULTS
[facility] - Emergency Dept  Neurology  Consult Note    Patient Name: Parvin Engel  MRN: [id number]  Admission Date: 12/2/2024  Hospital Length of Stay: 0 days  Code Status: Full Code   Attending Provider: No att. providers found   Consulting Provider: Danii Lisa PA-C  Primary Care Physician: No, Primary Doctor  Principal Problem:<principal problem not specified>    Inpatient consult to Neurology  Consult performed by: Danii Lisa PA-C  Consult ordered by: Vincenzo Fuentes MD         Subjective:     Chief Complaint:  syncope     HPI:   79 y.o. female with Alzheimer's dementia on lecanemab infusions presents 12/2/24 for syncope evaluation. Per chart review, pt was in town visiting son and daughter in-law for Thanksgiving. She had a fall exiting the shower on Thanksgiving, hit the R side of her face and was seen at [facility] ED 11/28 and [facility] without contrast unremarkable for acute pathology. She was discharged home. She did well the rest of the weekend and was able to go to [facility] on Sunday. On Monday, she was in the airport about to board a flight to [place] when she had an episode of LOC. She was accompanied by a relative who witnessed the episode. Pt was reportedly seating during the episode then slumped over and was unresponsive for seconds to ~1 minute. Possible diaphoresis during episode. Pt unsure if she had lightheadedness or dizziness prior. She remembers waking up at the airport. When she became responsive again, she was blinking frequently and did not return to cognitive baseline for a minute or so. EMS was called, initial SBP 90s. She wonders if she did not eat enough prior to going to the airport. Glucose was checked and documented to be at least 100. Family notes syncopal episode in July while at an outdoor sporting event. Another relative feels she may be more diaphoretic while exercising recently. She is currently admitted to observation and Cardiology consulted for syncope workup.  EKG with sinus rhythm, carotid U/S with no evidence of significant stenosis, BNP and Trop wnl, no hypotension noted, TTE with EF 60-65% and orthostatic vital signs negative. Neurology consulted 12/3 for syncope.     History reviewed. No pertinent past medical history.    Past Surgical History:   Procedure Laterality Date    BREAST SURGERY       SECTION      melanoma Right     arm,      Review of patient's allergies indicates:   Allergen Reactions    Codeine Nausea And Vomiting    Shellfish containing products Nausea And Vomiting     No current facility-administered medications on file prior to encounter.     Current Outpatient Medications on File Prior to Encounter   Medication Sig    donepeziL (ARICEPT) 10 MG tablet Take 10 mg by mouth once daily.    aspirin (ECOTRIN) 325 MG EC tablet Take 1 tablet (325 mg total) by mouth 2 (two) times daily.    calcium citrate-vitamin D3 315-200 mg (CITRACAL+D) 315-200 mg-unit per tablet Take 1 tablet by mouth 2 (two) times daily.    hydrocodone-acetaminophen 10-325mg (NORCO)  mg Tab Take 1 tablet by mouth every 4 to 6 hours as needed.    tramadol (ULTRAM) 50 mg tablet Take 1 tablet (50 mg total) by mouth every 4 to 6 hours as needed for Pain.     Family History    None       Tobacco Use    Smoking status: Former    Smokeless tobacco: Not on file   Substance and Sexual Activity    Alcohol use: No    Drug use: Not on file    Sexual activity: Not on file     Review of Systems   Constitutional:  Positive for activity change. Negative for fever.   HENT:  Negative for trouble swallowing and voice change.    Eyes:  Negative for photophobia, pain, redness and visual disturbance.   Respiratory:  Negative for cough and shortness of breath.    Cardiovascular:  Negative for chest pain and leg swelling.   Gastrointestinal:  Negative for nausea and vomiting.   Musculoskeletal:  Negative for gait problem and neck stiffness.   Neurological:  Negative for dizziness, speech  difficulty and headaches.   Psychiatric/Behavioral:  Negative for agitation and confusion.      Objective:     Vital Signs (Most Recent):  Temp: 97.9 °F (36.6 °C) (12/03/24 1102)  Pulse: 67 (12/03/24 1102)  Resp: 16 (12/03/24 1102)  BP: 129/60 (12/03/24 1102)  SpO2: 95 % (12/03/24 1102) Vital Signs (24h Range):  Temp:  [97.9 °F (36.6 °C)-98.2 °F (36.8 °C)] 97.9 °F (36.6 °C)  Pulse:  [67-83] 67  Resp:  [15-20] 16  SpO2:  [95 %-99 %] 95 %  BP: (121-158)/(60-72) 129/60     Weight: 59 kg (130 lb 1.1 oz)  Body mass index is 20.99 kg/m².     Physical Exam  Eyes:      Extraocular Movements: EOM normal.      Pupils: Pupils are equal, round, and reactive to light.   Skin:     Findings: Bruising present.   Neurological:      Gait: Gait is intact.      Deep Tendon Reflexes:      Reflex Scores:       Bicep reflexes are 2+ on the right side and 2+ on the left side.       Brachioradialis reflexes are 2+ on the right side and 2+ on the left side.       Patellar reflexes are 2+ on the right side and 2+ on the left side.  Psychiatric:         Speech: Speech normal.          NEUROLOGICAL EXAMINATION:     MENTAL STATUS   Oriented to person.   Oriented to place.   Follows 2 step commands.   Attention: normal. Concentration: normal.   Speech: speech is normal   Level of consciousness: alert  Knowledge: good.   Normal comprehension.     CRANIAL NERVES     CN II   Visual fields full to confrontation.     CN III, IV, VI   Pupils are equal, round, and reactive to light.  Extraocular motions are normal.   Nystagmus: none   Ophthalmoparesis: none    CN V   Facial sensation intact.     CN VII   Facial expression full, symmetric.     CN VIII   Hearing: intact    CN IX, X   Palate: symmetric    CN XII   CN XII normal.     MOTOR EXAM   Muscle bulk: normal  Overall muscle tone: normal    Strength   Right deltoid: 5/5  Left deltoid: 5/5  Right biceps: 5/5  Left biceps: 5/5  Right triceps: 5/5  Left triceps: 5/5  Right interossei: 5/5  Left  interossei: 5/5  Right iliopsoas: 5/5  Left iliopsoas: 5/5  Right quadriceps: 5/5  Left quadriceps: 5/5    REFLEXES     Reflexes   Right brachioradialis: 2+  Left brachioradialis: 2+  Right biceps: 2+  Left biceps: 2+  Right patellar: 2+  Left patellar: 2+    SENSORY EXAM   Light touch normal.     GAIT AND COORDINATION     Gait  Gait: normal    Tremor   Resting tremor: absent    Significant Labs: All pertinent lab results from the past 24 hours have been reviewed.    Significant Imaging: I have reviewed all pertinent imaging results/findings within the past 24 hours.    MRI Brain W WO contrast (12/3/24):  Mild cerebral volume loss with scattered patchy and confluent T2 FLAIR signal abnormality supratentorial white matter and lory which is nonspecific and may represent mild chronic ischemic change. Otherwise unremarkable MRI brain as detailed above specifically without evidence for acute infarction or intracranial enhancing mass lesion.     Assessment and Plan:     Syncope  79 y.o. female with Alzheimer's dementia on lecanemab infusions presents 12/2/24 for syncope evaluation. Patient is in town visiting for the Thanksgiving holiday and had a mechanical fall resulting in facial bruising on 11/28. Was seen at [facility] ED and CTH unremarkable for acute intracranial pathology. She was discharged home and did well the remainder of the weekend. She comes back to the ED yesterday after having a LOC episode at the airport while waiting to board her flight. Episode witnessed by friend who reports she slumped over and become unresponsive briefly. No report of convulsions, incontinence or tongue biting. SBP in 90s on EMS arrival. No report of hypoglycemia. She was admitted to observation and Cardiology, EP and Neurology consulted for syncope workup which has been negative thus far including: repeat CTHead, CT Max face, MRI brain W WO contrast, carotid U/S, EKG, BNP, trop, TTE, orthostatics. Cards/EP planning 30 day cardiac  event monitor. Presentation sounds more consistent with cardiac syncope rather than seizure.     Recommendations:  --patient at cognitive baseline without further clinical episodes  No focal deficits on neuro exam  --advised hydration and monitoring blood pressure  --if episodes persist and cardiac event monitor is unremarkable, would encourage ambulatory EEG for seizure evaluation  No seizure medications indicated at this time  --we do not feel strongly about keeping patient admitted for further neuro workup  Safe for discharge from Neurology standpoint  --continue outpatient lecanemab infusions per outpatient neurologist     VTE Risk Mitigation (From admission, onward)      None          Thank you for your consult. I will sign off. Please contact us if you have any additional questions.    Danii Lisa PA-C  General Neurology Consult

## 2024-12-04 NOTE — PLAN OF CARE
[facility] - Emergency Dept  Discharge Final Note    Primary Care Provider: No, Primary Doctor    Expected Discharge Date: 12/4/2024    Pt d/c home with no needs. Pt family provides transport for d/c.        Final Discharge Note (most recent)       Final Note - 12/04/24 1406          Final Note    Assessment Type Final Discharge Note (P)      Anticipated Discharge Disposition Home or Self Care (P)         Post-Acute Status    Post-Acute Authorization Other (P)      Coverage MEDICARE PART A & B (P)      Other Status No Post-Acute Service Needs (P)      Discharge Delays None known at this time (P)                      Important Message from Medicare     Discharge Plan A and Plan B have been determined by review of patient's clinical status, future medical and therapeutic needs, and coverage/benefits for post-acute care in coordination with multidisciplinary team members.    ALICIA Summers, MSW   Case Management  [facility]  Email: [email or web address]

## 2024-12-04 NOTE — PROCEDURES
Date of service  12/04/2024    Introduction  Electroencephalographic (EEG) recording is performed with electrodes placed according to the International 10-20 placement system. Thirty two (32) channels of digital signal (sampling rate of 512/sec) including T1 and T2 was simultaneously recorded from the scalp and may include EKG, EMG, and/or eye monitors. Recording band pass was 0.1 to 512 Hz. Digital video recording of the patient is simultaneously recorded with the EEG. The patient is instructed to report clinical symptoms which may occur during the recording session. EEG and video recording is stored and archived in digital format. Activation procedures which include photic stimulation, hyperventilation and instructing patients to perform simple tasks are done in selected patients.    The EEG is displayed on a monitor screen and can be reviewed using different montages. Computer assisted analysis is employed to detect spike and electrographic seizure activity. The entire record is submitted for computer analysis. The entire recording is visually reviewed and, the times identified by computer analysis as being spikes or seizures are reviewed again.     Compressed spectral analysis (CSA) is also performed on the activity recorded from each individual channel. This is displayed as a power display of frequencies from 0 to 30 Hz over time. The CSA is reviewed looking for asymmetries in power between homologous areas of the scalp and then compared with the original EEG recording.     Recording Times  12/4/24 08:58 - 10:59  1 hour and 58 minutes recorded and reviewed    Findings  The patient's background consists of a posteriorly dominant 9 Hz alpha rhythm, which is well-formed, modulated, and abolishes with eye opening.      Hyperventilation and photic stimulation were not performed.    During the course of the recording, the patient is noted to be awake, subsequently becomes drowsy, and falls asleep with normal,  symmetric sleep architecture seen.     There is no focal slowing.  There are no focal, lateralized, or epileptiform transients.  No electrographic seizures are seen.    EKG demonstrates regular rhythm.    Interpretation  This is a normal EEG during wakefulness and sleep. No potentially epileptiform activity was seen. Please be aware that a normal EEG does not exclude the possibility of an underlying seizure disorder.

## 2024-12-04 NOTE — ASSESSMENT & PLAN NOTE
Syncope while sitting with observed diaphoresis and return to consciousness.   Had a previous episode of syncope in July during a particularly hot day watching a sports game. Also with fall 11/28 she hit the right side of her face while exiting the shower. In the ED US carotid and CT head wnl.  and Trop wnl. EKG sinus. No leukocytosis. No hypotension.  UA with +2 ketones, glucose 118. Electrolytes wnl. Pt states she is baseline and doing well in ED. Less likely hypoglycemia due to normal glucose levels and self reported good PO intake though UA did have +2 ketones. Telemetry did not show any recording in airstrip ambulatory 2-4 weeks cardiac event monitor. No orthostatic hypotension. 129/60 Sitting  140/65 Lying. MRI benign. Repeat echo benign.     ambulatory 2-4 weeks cardiac event monitor        This document is complete and the patient is ready for discharge.

## 2024-12-04 NOTE — PROGRESS NOTES
[facility] Dept  Cardiology  Progress Note    Patient Name: Parvin Engel  MRN: [id number]  Admission Date: 12/2/2024  Hospital Length of Stay: 0 days  Code Status: Prior   Attending Physician: No att. providers found   Primary Care Physician: No, Primary Doctor  Expected Discharge Date: 12/4/2024  Principal Problem:Syncope    Subjective:     Hospital Course:   No notes on file    Interval History: EEG during exam. HDS.     ROS  Objective:     Vital Signs (Most Recent):  Temp: 97.8 °F (36.6 °C) (12/04/24 1236)  Pulse: 79 (12/04/24 1236)  Resp: 18 (12/04/24 1236)  BP: (!) 146/73 (12/04/24 1236)  SpO2: 98 % (12/04/24 1236) Vital Signs (24h Range):  Temp:  [97.8 °F (36.6 °C)-98.5 °F (36.9 °C)] 97.8 °F (36.6 °C)  Pulse:  [70-92] 79  Resp:  [15-18] 18  SpO2:  [95 %-98 %] 98 %  BP: (131-160)/(65-79) 146/73     Weight: 59 kg (130 lb 1.1 oz)  Body mass index is 20.99 kg/m².     SpO2: 98 %         Intake/Output Summary (Last 24 hours) at 12/4/2024 1729  Last data filed at 12/4/2024 0029  Gross per 24 hour   Intake 500 ml   Output --   Net 500 ml       Lines/Drains/Airways       None                      Physical Exam  Constitutional:       Appearance: Normal appearance. She is not ill-appearing.   HENT:      Head:      Comments: ecchymosis around the right eye  Eyes:      General:         Right eye: No discharge.         Left eye: No discharge.   Cardiovascular:      Rate and Rhythm: Normal rate and regular rhythm.      Pulses: Normal pulses.      Heart sounds: Normal heart sounds.   Pulmonary:      Effort: Pulmonary effort is normal.      Breath sounds: Normal breath sounds.   Abdominal:      General: Abdomen is flat.      Palpations: Abdomen is soft.      Tenderness: There is no abdominal tenderness.   Musculoskeletal:      Right lower leg: No edema.      Left lower leg: No edema.   Skin:     General: Skin is warm and dry.   Neurological:      Mental Status: She is alert. Mental status is at baseline.    Psychiatric:         Mood and Affect: Mood normal.         Behavior: Behavior normal.         Thought Content: Thought content normal.         Judgment: Judgment normal.            Significant Labs:   Recent Lab Results       None            Significant Imaging: Echocardiogram: 2D echo with color flow doppler: No results found for this or any previous visit. and Transthoracic echo (TTE) complete (Cupid Only):   Results for orders placed or performed during the hospital encounter of 12/02/24   Echo   Result Value Ref Range    LV Diastolic Volume 63.61 mL    Echo EF Estimated 72 %    LV Systolic Volume 18.04 mL    IVS 0.9 0.6 - 1.1 cm    LVIDd 3.8 3.5 - 6.0 cm    LVIDs 2.3 2.1 - 4.0 cm    LVOT diameter 2.0 cm    PW 0.7 0.6 - 1.1 cm    AV LVOT peak gradient 2 mmHg    LVOT mn grad 1 mmHg    LVOT peak gabe 0.7 m/s    LVOT peak VTI 15.0 cm    RV- rojas basal diam 3.7 cm    RV S' 15.45 cm/s    LA size 2.93 cm    Left Atrium Major Axis 3.72 cm    Left Atrium Minor Axis 3.18 cm    AV valve area 2.7 cm2    AV area by cont VTI 2.6 cm2    AV peak gradient 2.0 mmHg    AV mean gradient 1.7 mmHg    Ao peak gabe 0.7 m/s    Ao VTI 17.6 cm    MV Peak A Gabe 0.68 m/s    E wave deceleration time 132.80 ms    MV Peak E Gabe 0.42 m/s    E/A ratio 0.62     LV LATERAL E/E' RATIO 7.00     LV SEPTAL E/E' RATIO 10.50     TDI LATERAL 0.06 m/s    TDI SEPTAL 0.04 m/s    TV peak gradient 26 mmHg    TR Max Gabe 2.57 m/s    Ascending aorta 3.47 cm    STJ 2.80 cm    Sinus 3.26 cm    LA WIDTH 3.54 cm    TAPSE 3.03 cm    BSA 1.66 m2    LVOT stroke volume 47.1 cm3    LV Systolic Volume Index 10.8 mL/m2    LV Diastolic Volume Index 38.09 mL/m2    LVOT area 3.1 cm2    FS 39.5 28 - 44 %    Left Ventricle Relative Wall Thickness 0.37 cm    LV mass 86.0 g    LV Mass Index 51.5 g/m2    E/E' ratio 8.40 m/s    NADIA 18.1 mL/m2    LA Vol 30.23 cm3    RV/LV Ratio 0.97 cm    AV Velocity Ratio 1.00     AV index (prosthetic) 0.85     JOHN by Velocity Ratio 3.1 cm²     Triscuspid Valve Regurgitation Peak Gradient 26 mmHg    Mean e' 0.05 m/s    ZLVIDS -1.81     ZLVIDD -2.06     Narrative      Left Ventricle: The left ventricle is normal in size. Normal wall   thickness. There is normal systolic function with a visually estimated   ejection fraction of 60 - 65%. There is normal diastolic function.    Right Ventricle: Normal right ventricular cavity size. Wall thickness   is normal. Systolic function is normal.    Aortic Valve: The aortic valve is a trileaflet valve. There is mild   aortic valve sclerosis.    Tricuspid Valve: There is mild regurgitation.    Pulmonary Artery: The estimated pulmonary artery systolic pressure is   at least 29 mmHg.       Assessment and Plan:         * Syncope  Syncope while sitting with observed diaphoresis and return to consciousness.   Had a previous episode of syncope in July during a particularly hot day watching a sports game. Also with fall 11/28 she hit the right side of her face while exiting the shower. In the ED US carotid and CT head wnl.  and Trop wnl. EKG sinus. No leukocytosis. No hypotension.  UA with +2 ketones, glucose 118. Electrolytes wnl. Pt states she is baseline and doing well in ED. Less likely hypoglycemia due to normal glucose levels and self reported good PO intake though UA did have +2 ketones. Telemetry did not show any recording in airstrip ambulatory 2-4 weeks cardiac event monitor. No orthostatic hypotension. 129/60 Sitting  140/65 Lying. MRI benign. Repeat echo benign.     ambulatory 2-4 weeks cardiac event monitor             VTE Risk Mitigation (From admission, onward)      None            Mathew Harris MD  Cardiology  [facility]

## 2024-12-04 NOTE — ASSESSMENT & PLAN NOTE
79 y.o. female with Alzheimer's dementia on lecanemab infusions presents 12/2/24 for syncope evaluation. Patient is in town visiting for the Thanksgiving holiday and had a mechanical fall resulting in facial bruising on 11/28. Was seen at [facility] ED and CTH unremarkable for acute intracranial pathology. She was discharged home and did well the remainder of the weekend. She comes back to the ED yesterday after having a LOC episode at the airport while waiting to board her flight. Episode witnessed by friend who reports she slumped over and become unresponsive briefly. No report of convulsions, incontinence or tongue biting. SBP in 90s on EMS arrival. No report of hypoglycemia. She was admitted to observation and Cardiology, EP and Neurology consulted for syncope workup which has been negative thus far including: repeat CTHead, CT Max face, MRI brain W WO contrast, carotid U/S, EKG, BNP, trop, TTE, orthostatics. Cards/EP planning 30 day cardiac event monitor. Presentation sounds more consistent with cardiac syncope rather than seizure.     Recommendations:  --patient at cognitive baseline without further clinical episodes  No focal deficits on neuro exam  --advised hydration and monitoring blood pressure  --if episodes persist and cardiac event monitor is unremarkable, would encourage ambulatory EEG for seizure evaluation  No seizure medications indicated at this time  --we do not feel strongly about keeping patient admitted for further neuro workup  Safe for discharge from Neurology standpoint  --continue outpatient lecanemab infusions per outpatient neurologist

## 2024-12-04 NOTE — DISCHARGE INSTRUCTIONS
Your MRI did not show any acute abnormality of your brain.  Your echocardiogram did not show any concerning findings with your heart valves or evidence of heart failure.   Electrophysiology recommend that you wear a 30 day cardiac monitor.  You will be contacted with information on how/went to  your device.     Return to the ER immediately for any new or significantly worsening symptoms such as multiple recurrent episodes, chest pain, difficulty breathing, changes in mental status, difficulty speaking or any other worrisome symptoms.

## 2024-12-04 NOTE — ASSESSMENT & PLAN NOTE
Parvin Engel is a 79 y.o. female with a history of Alzheimer's dementia who presents to [facility] ED on 12/2/2024 for emergent evaluation of syncope. Electrophysiology has been consulted for evaluation of  syncope.       EKG 12/02/2024 normal sinus rhythm  EKG 12/2/16 normal sinus rhythm   CT head negative  Telemetry: Normal sinus rhythm  No any lab abnormalities  TTE 12/3/24 showed LVEF 60 - 65%. no other hemodynamically significant structural valvular abnormality.    In view of frequent episodes of syncope without any prodromal symptoms we recommend provocative testing either stress test or walking hallway to observe chronotropic response.   Recommend ambulatory 2-4 weeks cardiac event monitor   Adequate Hydration     Thank you for your consult.

## 2024-12-04 NOTE — PROVIDER PROGRESS NOTES - EMERGENCY DEPT.
ED Observation Unit  Progress Note      HPI   Parvin Engel is a 79 y.o. female with a history of Alzheimer's dementia who [place] [facility] [place] for emergent evaluation of syncope today.      Patient was at the airport waiting to board a flight to return home after visiting her son and daughter-in-law, who are both [employer] physicians.  Family member who accompanied her states that she passed out while seated in the gate waiting area for her flight.  She is alert and oriented to person, , and situation, but not year or location. The patient denies prodrome symptoms prior to syncope today. She had felt well today before this occurred.  Her son states that she slipped in the shower 3 days ago and went to [facility] ED where she had a head CT that was negative.  She has not fallen since.  She has some bruising on her eye that was from that fall. Son also mentions a similar episode over the summer while she pasted out at the park. They have also noticed patient having episodes of clamminess with exertion in the past, but none recently. She did eat lunch today. She denies neck pain, back pain, chest pain, SOB, abd pain, extremity weakness, paresthesias, history of arrhythmias, MIs, HF, DVT/PE, hormone use, tobacco use, or cancer. She also denies infections symptoms such as cough, nausea, vomiting, diarrhea, dysuria, frequency.     In the ED, ECG with NSR. Nonspecific T wave changes noted. Normal intervals. No STEMI. Labs without signs of leukocytosis, anemia, electrolyte abn, or KADE. Glucose 118. Normal LFTs. 1st troponin 0.015. .      I reviewed the ED Provider Note dated 2024 prior to my evaluation of this patient.  I reviewed all labs and imaging performed in the Main ED, prior to patient being placed in Observation. Patient was placed in the ED Observation Unit for syncope.     Interval History   Patient seen and examined this morning. BRIONNA COLLAZO. Undergoing EEG at this time. Previous  imaging and consultants notes reviewed. Cardiac monitor delivered to the bedside. Patient has no complaints this morning and denies further episodes of syncope and other related symptoms. Anticipate d/c this afternoon pending EEG results.     PMHx   History reviewed. No pertinent past medical history.   Past Surgical History:   Procedure Laterality Date    BREAST SURGERY       SECTION      melanoma Right     arm, 2001ish        Family Hx   No family history on file.     Social Hx   Social History     Socioeconomic History    Marital status:    Tobacco Use    Smoking status: Former   Substance and Sexual Activity    Alcohol use: No     Social Drivers of Health     Housing Stability: Low Risk  (12/3/2024)    Housing Stability Vital Sign     Unable to Pay for Housing in the Last Year: No     Homeless in the Last Year: No        Vital Signs   Vitals:    24 1824 24 2100 24 0127 24 0524   BP: 131/78 136/74 (!) 145/76 (!) 148/65   BP Location:       Patient Position: Standing      Pulse: 92 90 72 70   Resp: 17 16 15    Temp: 98.5 °F (36.9 °C) 98 °F (36.7 °C) 98 °F (36.7 °C) 97.8 °F (36.6 °C)   TempSrc: Oral  Oral Oral   SpO2: 95% 98% 96% 98%   Weight:       Height:            Review of Systems  Review of Systems   Constitutional:  Negative for chills and fever.   Respiratory:  Negative for shortness of breath.    Cardiovascular:  Negative for chest pain.   Neurological:  Negative for dizziness, loss of consciousness and headaches.       Brief Physical Exam/Reassessment   Physical Exam  Constitutional:       Appearance: Normal appearance.      Comments: EEG in place   HENT:      Nose: Nose normal.      Mouth/Throat:      Mouth: Mucous membranes are moist.   Eyes:      Extraocular Movements: Extraocular movements intact.   Cardiovascular:      Rate and Rhythm: Normal rate and regular rhythm.   Pulmonary:      Effort: Pulmonary effort is normal.   Abdominal:      General: Abdomen is  flat.   Musculoskeletal:      Cervical back: Normal range of motion.   Neurological:      General: No focal deficit present.      Mental Status: She is alert. Mental status is at baseline.         Labs/Imaging   Labs Reviewed   CBC W/ AUTO DIFFERENTIAL - Abnormal       Result Value    WBC 8.80      RBC 3.82 (*)     Hemoglobin 12.7      Hematocrit 38.7       (*)     MCH 33.2 (*)     MCHC 32.8      RDW 12.6      Platelets 199      MPV 9.6      Immature Granulocytes 0.3      Gran # (ANC) 7.4      Immature Grans (Abs) 0.03      Lymph # 0.8 (*)     Mono # 0.5      Eos # 0.0      Baso # 0.03      nRBC 0      Gran % 84.2 (*)     Lymph % 9.4 (*)     Mono % 5.7      Eosinophil % 0.1      Basophil % 0.3      Differential Method Automated     COMPREHENSIVE METABOLIC PANEL - Abnormal    Sodium 139      Potassium 3.8      Chloride 105      CO2 24      Glucose 118 (*)     BUN 17      Creatinine 0.9      Calcium 9.0      Total Protein 7.0      Albumin 3.8      Total Bilirubin 0.5      Alkaline Phosphatase 65      AST 25      ALT 20      eGFR >60.0      Anion Gap 10     B-TYPE NATRIURETIC PEPTIDE - Abnormal     (*)    URINALYSIS, REFLEX TO URINE CULTURE - Abnormal    Specimen UA Urine, Clean Catch      Color, UA Yellow      Appearance, UA Clear      pH, UA 6.0      Specific Gravity, UA 1.020      Protein, UA Negative      Glucose, UA Negative      Ketones, UA 2+ (*)     Bilirubin (UA) Negative      Occult Blood UA Negative      Nitrite, UA Negative      Leukocytes, UA Negative      Narrative:     Specimen Source->Urine   CBC W/ AUTO DIFFERENTIAL - Abnormal    WBC 6.05      RBC 3.60 (*)     Hemoglobin 12.2      Hematocrit 35.8 (*)     MCV 99 (*)     MCH 33.9 (*)     MCHC 34.1      RDW 12.6      Platelets 196      MPV 9.9      Immature Granulocytes 0.3      Gran # (ANC) 3.8      Immature Grans (Abs) 0.02      Lymph # 1.7      Mono # 0.5      Eos # 0.1      Baso # 0.03      nRBC 0      Gran % 62.2      Lymph % 28.8       Mono % 7.4      Eosinophil % 0.8      Basophil % 0.5      Differential Method Automated     BASIC METABOLIC PANEL - Abnormal    Sodium 140      Potassium 3.7      Chloride 106      CO2 21 (*)     Glucose 91      BUN 15      Creatinine 0.8      Calcium 8.5 (*)     Anion Gap 13      eGFR >60.0     TROPONIN I    Troponin I 0.015     TROPONIN I    Troponin I <0.006        Imaging Results              MRI Brain W WO Contrast (Final result)  Result time 12/03/24 16:55:43      Final result by Gabriel Davis DO (12/03/24 16:55:43)                   Impression:      Mild cerebral volume loss with scattered patchy and confluent T2 FLAIR signal abnormality supratentorial white matter and lory which is nonspecific and may represent mild chronic ischemic change.    Otherwise unremarkable MRI brain as detailed above specifically without evidence for acute infarction or intracranial enhancing mass lesion.      Electronically signed by: Gabriel Davis DO  Date:    12/03/2024  Time:    16:55               Narrative:    EXAMINATION:  MRI BRAIN W WO CONTRAST    CLINICAL HISTORY:  Syncope, recurrent;    TECHNIQUE:  Sagittal and axial T1, axial T2, axial FLAIR, axial gradient, axial diffusion imaging of the whole brain pre-contrast with postcontrast axial T1 and axial spoiled gradient imaging reformatted in the coronal and sagittal planes.. Six ml of Gadavist injected intravenously.    COMPARISON:  CT 12/02/2024    FINDINGS:  Generalized cerebral volume loss with compensatory enlargement ventricles sulci and cisterns.  Ventricles relatively stable without evidence for hydrocephalus.  There is patchy and confluent T2 FLAIR signal hyperintensity supratentorial white matter with ill-defined component lory while nonspecific concerning for underlying chronic ischemic change.  There is no restricted diffusion to suggest acute or recent infarction.  Punctate foci of susceptibility within the left occipital lobe and right parietal lobe  suggestive for remote microhemorrhages.  No evidence for recent hemorrhage.  No abnormal parenchymal enhancement.                                       US Carotid Bilateral (Final result)  Result time 12/02/24 23:49:04      Final result by John Bianchi DO (12/02/24 23:49:04)                   Impression:      No evidence of a hemodynamically significant carotid bifurcation stenosis.      Electronically signed by: John Bianchi  Date:    12/02/2024  Time:    23:49               Narrative:    EXAMINATION:  US CAROTID BILATERAL    CLINICAL HISTORY:  Syncope and collapse    TECHNIQUE:  Grayscale and color Doppler ultrasound examination of the carotid and vertebral artery systems bilaterally.  Stenosis estimates are per the NASCET measurement criteria.    COMPARISON:  None.    FINDINGS:  Right:    Internal Carotid Artery (ICA) peak systolic velocity 112 cm/sec    ICA/CCA peak systolic velocity ratio: 1.6    Plaque formation: Homogeneous    Vertebral artery: Antegrade flow and normal waveform.    Left:    Internal Carotid Artery (ICA)  peak systolic velocity 121 cm/sec    ICA/CCA peak systolic velocity ratio: 1.4    Plaque formation: Homogeneous    Vertebral artery: Antegrade flow and normal waveform.                                       CT Head Without Contrast (Final result)  Result time 12/02/24 22:48:44      Final result by Nikolas Banegas MD (12/02/24 22:48:44)                   Impression:      No major vascular distribution infarction.    No acute intracranial hemorrhage or depressed calvarial fracture.    Evidence of chronic microvascular ischemic change.    Electronically signed by resident: Deangelo Ghosh  Date:    12/02/2024  Time:    21:43    Electronically signed by: Nikolas Banegas  Date:    12/02/2024  Time:    22:48               Narrative:    EXAMINATION:  CT HEAD WITHOUT CONTRAST    CLINICAL HISTORY:  Head trauma, minor (Age >= 65y);Mental status change, unknown cause;    TECHNIQUE:  Low dose  axial CT images obtained throughout the head without intravenous contrast. Sagittal and coronal reconstructions were performed.    COMPARISON:  CT head 11/28/2024    FINDINGS:  Intracranial compartment:    Ventricles and sulci are unchanged in size and configuration without evidence of hydrocephalus. No extra-axial blood or fluid collections.    The brain parenchyma appears unchanged.  There are patchy areas of hypoattenuation in the supratentorial white matter, which is nonspecific and most likely reflects changes of chronic microvascular ischemic changes.  No new major vascular distribution infarction.  No acute parenchymal hemorrhage.  No mass effect or midline shift.    Coarse intracranial ICA and vertebrobasilar arterial calcifications.    Skull/extracranial contents (limited evaluation):    No depressed calvarial fracture.  Mastoid air cells and paranasal sinuses are essentially clear.                                       I reviewed all labs, imaging, EKGs.     Plan   Syncope  -In the ED, ECG with NSR. Nonspecific T wave changes noted. Normal intervals. No STEMI.   -Labs without signs of leukocytosis, anemia, electrolyte abn, or KADE. Glucose 118. Normal LFTs.   -troponins x2 negative  -No prodromal symptoms today with episode. History of syncope over the summer and symptoms with exertion in the past.  -CT head and US carotid without acute process  -UA without evidence of infection  -ECHO without acute cardiac abnormalities  -Tele  -MRI brain without acute process  -will continue to monitor overnight to receive cardiac event monitor tomorrow without break in telemetry monitoring >> cardiac monitor delivered to bedside  - I have ordered an EEG since patient will stay in Obs.  This may not be done until the afternoon.  Son is aware.  EEG can be cancelled if patient has received cardiac monitor and is ready for discharge.   - anticipate d/c this afternoon pending EEG results    I have discussed this case with  Stacie Chavez PA-C.

## 2024-12-04 NOTE — ED NOTES
Pt bed alarm goes off and RN assists patient to bathroom at this time d/t fall risk. Pt is able to ambulate with a stable gait.

## 2024-12-04 NOTE — PROGRESS NOTES
[facility] - Emergency Dept  Cardiac Electrophysiology  Progress Note    Admission Date: 2024  Code Status: Full Code   Attending Physician: No att. providers found   Expected Discharge Date: 2024  Principal Problem:Syncope    Subjective:     Interval History: No Acute Events Overnight . She was getting EEG today morning during our interactions with her. She denies any further syncopal episodes. Telemetry did not show any recording in airstrip, telemetry was disconnected overnight. After plugging in tele in EDOU showed it was normal sinus rhythm with HR in 70s.    History reviewed. No pertinent past medical history.    Past Surgical History:   Procedure Laterality Date    BREAST SURGERY       SECTION      melanoma Right     arm, ish       Review of patient's allergies indicates:   Allergen Reactions    Codeine Nausea And Vomiting    Shellfish containing products Nausea And Vomiting       No current facility-administered medications on file prior to encounter.     Current Outpatient Medications on File Prior to Encounter   Medication Sig    donepeziL (ARICEPT) 10 MG tablet Take 10 mg by mouth once daily.    aspirin (ECOTRIN) 325 MG EC tablet Take 1 tablet (325 mg total) by mouth 2 (two) times daily.    calcium citrate-vitamin D3 315-200 mg (CITRACAL+D) 315-200 mg-unit per tablet Take 1 tablet by mouth 2 (two) times daily.    [DISCONTINUED] hydrocodone-acetaminophen 10-325mg (NORCO)  mg Tab Take 1 tablet by mouth every 4 to 6 hours as needed.    [DISCONTINUED] tramadol (ULTRAM) 50 mg tablet Take 1 tablet (50 mg total) by mouth every 4 to 6 hours as needed for Pain.     Family History    None       Tobacco Use    Smoking status: Former    Smokeless tobacco: Not on file   Substance and Sexual Activity    Alcohol use: No    Drug use: Not on file    Sexual activity: Not on file     ROS  Review of Systems  Constitution: Denies chills, fever, and sweats.  HENT: Denies headaches or blurry  vision.  Cardiovascular: Denies chest pain or irregular heart beat.  Respiratory: Denies cough or shortness of breath.  Gastrointestinal: Denies abdominal pain, nausea, or vomiting.  Musculoskeletal: Denies muscle cramps.  Neurological: Denies dizziness or focal weakness.  Psychiatric/Behavioral: Normal mental status.  Hematologic/Lymphatic: Denies bleeding problem or easy bruising/bleeding.  Skin: Denies rash or suspicious lesions     Objective:     Vital Signs (Most Recent):  Temp: 97.8 °F (36.6 °C) (12/04/24 1236)  Pulse: 79 (12/04/24 1236)  Resp: 18 (12/04/24 1236)  BP: (!) 146/73 (12/04/24 1236)  SpO2: 98 % (12/04/24 1236) Vital Signs (24h Range):  Temp:  [97.8 °F (36.6 °C)-98.5 °F (36.9 °C)] 97.8 °F (36.6 °C)  Pulse:  [68-92] 79  Resp:  [15-18] 18  SpO2:  [95 %-98 %] 98 %  BP: (131-160)/(65-79) 146/73     Weight: 59 kg (130 lb 1.1 oz)  Body mass index is 20.99 kg/m².    SpO2: 98 %         Intake/Output Summary (Last 24 hours) at 12/4/2024 1330  Last data filed at 12/4/2024 0029  Gross per 24 hour   Intake 500 ml   Output --   Net 500 ml       Lines/Drains/Airways       None                    Physical Exam  Constitutional:       Appearance: Normal appearance. She is not ill-appearing.   HENT:      Head:      Comments: ecchymosis around the right eye  Eyes:      General:         Right eye: No discharge.         Left eye: No discharge.   Cardiovascular:      Rate and Rhythm: Normal rate and regular rhythm.      Pulses: Normal pulses.      Heart sounds: Normal heart sounds.   Pulmonary:      Effort: Pulmonary effort is normal.      Breath sounds: Normal breath sounds.   Abdominal:      General: Abdomen is flat.      Palpations: Abdomen is soft.      Tenderness: There is no abdominal tenderness.   Musculoskeletal:      Right lower leg: No edema.      Left lower leg: No edema.   Skin:     General: Skin is warm and dry.   Neurological:      Mental Status: She is alert. Mental status is at baseline.   Psychiatric:          Mood and Affect: Mood normal.         Behavior: Behavior normal.         Thought Content: Thought content normal.         Judgment: Judgment normal.          Significant Labs:   Recent Lab Results       None            Significant Imaging: CT scan: Head reviewed and EKG: reviewed  TTE Echo 12/3/24    Left Ventricle: The left ventricle is normal in size. Normal wall thickness. There is normal systolic function with a visually estimated ejection fraction of 60 - 65%. There is normal diastolic function.    Right Ventricle: Normal right ventricular cavity size. Wall thickness is normal. Systolic function is normal.    Aortic Valve: The aortic valve is a trileaflet valve. There is mild aortic valve sclerosis.    Tricuspid Valve: There is mild regurgitation.    Pulmonary Artery: The estimated pulmonary artery systolic pressure is at least 29 mmHg    Assessment and Plan:     * Syncope  Parvin Engel is a 79 y.o. female with a history of Alzheimer's dementia who presents to [facility] ED on 12/2/2024 for emergent evaluation of syncope. Electrophysiology has been consulted for evaluation of  syncope.       EKG 12/02/2024 normal sinus rhythm  EKG 12/2/16 normal sinus rhythm   CT head negative  Telemetry: Normal sinus rhythm  No any lab abnormalities  TTE 12/3/24 showed LVEF 60 - 65%. no other hemodynamically significant structural valvular abnormality.    In view of frequent episodes of syncope without any prodromal symptoms we recommend provocative testing either stress test or walking hallway to observe chronotropic response.   Recommend ambulatory 2-4 weeks cardiac event monitor   Adequate Hydration     Thank you for your consult.         Bobo Mullins MD  Cardiac Electrophysiology  [facility] - Emergency Dept

## 2024-12-04 NOTE — SUBJECTIVE & OBJECTIVE
Interval History: EEG during exam. HDS.     ROS  Objective:     Vital Signs (Most Recent):  Temp: 97.8 °F (36.6 °C) (12/04/24 1236)  Pulse: 79 (12/04/24 1236)  Resp: 18 (12/04/24 1236)  BP: (!) 146/73 (12/04/24 1236)  SpO2: 98 % (12/04/24 1236) Vital Signs (24h Range):  Temp:  [97.8 °F (36.6 °C)-98.5 °F (36.9 °C)] 97.8 °F (36.6 °C)  Pulse:  [70-92] 79  Resp:  [15-18] 18  SpO2:  [95 %-98 %] 98 %  BP: (131-160)/(65-79) 146/73     Weight: 59 kg (130 lb 1.1 oz)  Body mass index is 20.99 kg/m².     SpO2: 98 %         Intake/Output Summary (Last 24 hours) at 12/4/2024 1729  Last data filed at 12/4/2024 0029  Gross per 24 hour   Intake 500 ml   Output --   Net 500 ml       Lines/Drains/Airways       None                      Physical Exam  Constitutional:       Appearance: Normal appearance. She is not ill-appearing.   HENT:      Head:      Comments: ecchymosis around the right eye  Eyes:      General:         Right eye: No discharge.         Left eye: No discharge.   Cardiovascular:      Rate and Rhythm: Normal rate and regular rhythm.      Pulses: Normal pulses.      Heart sounds: Normal heart sounds.   Pulmonary:      Effort: Pulmonary effort is normal.      Breath sounds: Normal breath sounds.   Abdominal:      General: Abdomen is flat.      Palpations: Abdomen is soft.      Tenderness: There is no abdominal tenderness.   Musculoskeletal:      Right lower leg: No edema.      Left lower leg: No edema.   Skin:     General: Skin is warm and dry.   Neurological:      Mental Status: She is alert. Mental status is at baseline.   Psychiatric:         Mood and Affect: Mood normal.         Behavior: Behavior normal.         Thought Content: Thought content normal.         Judgment: Judgment normal.            Significant Labs:   Recent Lab Results       None            Significant Imaging: Echocardiogram: 2D echo with color flow doppler: No results found for this or any previous visit. and Transthoracic echo (TTE) complete  (Cupid Only):   Results for orders placed or performed during the hospital encounter of 12/02/24   Echo   Result Value Ref Range    LV Diastolic Volume 63.61 mL    Echo EF Estimated 72 %    LV Systolic Volume 18.04 mL    IVS 0.9 0.6 - 1.1 cm    LVIDd 3.8 3.5 - 6.0 cm    LVIDs 2.3 2.1 - 4.0 cm    LVOT diameter 2.0 cm    PW 0.7 0.6 - 1.1 cm    AV LVOT peak gradient 2 mmHg    LVOT mn grad 1 mmHg    LVOT peak gaeb 0.7 m/s    LVOT peak VTI 15.0 cm    RV- rojas basal diam 3.7 cm    RV S' 15.45 cm/s    LA size 2.93 cm    Left Atrium Major Axis 3.72 cm    Left Atrium Minor Axis 3.18 cm    AV valve area 2.7 cm2    AV area by cont VTI 2.6 cm2    AV peak gradient 2.0 mmHg    AV mean gradient 1.7 mmHg    Ao peak gabe 0.7 m/s    Ao VTI 17.6 cm    MV Peak A Gabe 0.68 m/s    E wave deceleration time 132.80 ms    MV Peak E Gabe 0.42 m/s    E/A ratio 0.62     LV LATERAL E/E' RATIO 7.00     LV SEPTAL E/E' RATIO 10.50     TDI LATERAL 0.06 m/s    TDI SEPTAL 0.04 m/s    TV peak gradient 26 mmHg    TR Max Gabe 2.57 m/s    Ascending aorta 3.47 cm    STJ 2.80 cm    Sinus 3.26 cm    LA WIDTH 3.54 cm    TAPSE 3.03 cm    BSA 1.66 m2    LVOT stroke volume 47.1 cm3    LV Systolic Volume Index 10.8 mL/m2    LV Diastolic Volume Index 38.09 mL/m2    LVOT area 3.1 cm2    FS 39.5 28 - 44 %    Left Ventricle Relative Wall Thickness 0.37 cm    LV mass 86.0 g    LV Mass Index 51.5 g/m2    E/E' ratio 8.40 m/s    NADIA 18.1 mL/m2    LA Vol 30.23 cm3    RV/LV Ratio 0.97 cm    AV Velocity Ratio 1.00     AV index (prosthetic) 0.85     JOHN by Velocity Ratio 3.1 cm²    Triscuspid Valve Regurgitation Peak Gradient 26 mmHg    Mean e' 0.05 m/s    ZLVIDS -1.81     ZLVIDD -2.06     Narrative      Left Ventricle: The left ventricle is normal in size. Normal wall   thickness. There is normal systolic function with a visually estimated   ejection fraction of 60 - 65%. There is normal diastolic function.    Right Ventricle: Normal right ventricular cavity size. Wall  thickness   is normal. Systolic function is normal.    Aortic Valve: The aortic valve is a trileaflet valve. There is mild   aortic valve sclerosis.    Tricuspid Valve: There is mild regurgitation.    Pulmonary Artery: The estimated pulmonary artery systolic pressure is   at least 29 mmHg.

## 2024-12-04 NOTE — ED NOTES
Received report from JUNE Schulz. Assumed care of Parvin Engel, a 79 y.o. female at this time.    Triage note:  Chief Complaint   Patient presents with    Loss of Consciousness     Pt had two episodes of syncope today. Pt seen recently for a fall on Thanksgiving.      Telemetry Verification   Patient placed on Telemetry Box  Verified on EDOU monitor  Box 0341   Rate 69   Rhythm NS        LOC: The patient is awake, alert and aware of environment with an appropriate affect, oriented x 4 and speaking appropriately.   APPEARANCE: Patient appears comfortable and in no acute distress, patient is clean and well groomed.  NEURO: Pt opens eyes spontaneously, behavior appropriate to situation, follows commands, facial expression symmetrical, bilateral hand grasp equal and even, purposeful motor response noted, normal sensation in all extremities when touched with a finger, PERRLA noted. Pt denies any dizziness at this time.  HEENT: Head is normocephalic and sits midline on the shoulders, eyes are symmetrical with no blurry vision or glasses; ears are clean, dry, intact, patent with no cerumen blocking the pathway, no hearing aids present; nose is free from clean, dry, intact, without any drainage, both nostrils are patent.   SKIN: The skin is warm and dry, color consistent with ethnicity, patient has normal skin turgor and moist mucus membranes, skin intact, contusion noted on right cheek.  MUSCULOSKELETAL: Patient moving all extremities spontaneously with AROM in all extremities, no swelling noted. Pt is able to ambulate with a steady gait. D/t hx of fall, bed alarm set at this time.  RESPIRATORY: Airway is open and patent, respirations are spontaneous, patient has a normal effort and rate, no accessory muscle use noted, breath sounds are clear and equal bilaterally. The pt denies SOB at this time.  CARDIAC: Pt placed on cardiac monitor. Patient has a normal rate and regular rhythm, no edema noted, capillary refill  < 3 seconds. The pt denies chest pain at this time.  GASTRO: Soft and non tender to palpation, no distention noted, normoactive bowel sounds present in all four quadrants. The pt denies any nausea or vomiting at this time.  : Pt denies any pain or frequency with urination.  VASCULAR: All pulses 2+, all extremities warm, no numbness or tingling to any extremities, no edema noted.

## 2024-12-04 NOTE — PROGRESS NOTES
ED Observation Unit  Progress Note      HPI   Parvin Engel is a 79 y.o. female with a history of Alzheimer's dementia who [place] [facility] [place] for emergent evaluation of syncope today.      Patient was at the airport waiting to board a flight to return home after visiting her son and daughter-in-law, who are both [employer] physicians.  Family member who accompanied her states that she passed out while seated in the gate waiting area for her flight.  She is alert and oriented to person, , and situation, but not year or location. The patient denies prodrome symptoms prior to syncope today. She had felt well today before this occurred.  Her son states that she slipped in the shower 3 days ago and went to [facility] ED where she had a head CT that was negative.  She has not fallen since.  She has some bruising on her eye that was from that fall. Son also mentions a similar episode over the summer while she pasted out at the park. They have also noticed patient having episodes of clamminess with exertion in the past, but none recently. She did eat lunch today. She denies neck pain, back pain, chest pain, SOB, abd pain, extremity weakness, paresthesias, history of arrhythmias, MIs, HF, DVT/PE, hormone use, tobacco use, or cancer. She also denies infections symptoms such as cough, nausea, vomiting, diarrhea, dysuria, frequency.     In the ED, ECG with NSR. Nonspecific T wave changes noted. Normal intervals. No STEMI. Labs without signs of leukocytosis, anemia, electrolyte abn, or KADE. Glucose 118. Normal LFTs. 1st troponin 0.015. .      I reviewed the ED Provider Note dated 2024 prior to my evaluation of this patient.  I reviewed all labs and imaging performed in the Main ED, prior to patient being placed in Observation. Patient was placed in the ED Observation Unit for syncope.     Interval History   Patient seen by cardiology attending who recommended MRI of the brain, neuro consult and EP  consult.  Echo was also obtained and ultimately revealed no concerning abnormalities.  MRI brain showed no acute process.  Chronic ischemic changes noted.  Neurology had also initially recommended an EEG, but ultimately felt that if patient was discharged that this could be done on an outpatient basis. EP recommended a 30 day cardiac monitor as well as orthostatic vital signs.  Orthostatic vital signs were slightly positive with systolic dropping from 160 to130. HR stable.   Unfortunately, patient would not be able to receive a cardiac event monitor prior to discharge.  Had lengthy discussion with patient's son weighing possible risks of missing a cardiac arrhythmia in the short period that patient would not be on a monitor.  I was able to speak with the electrophysiology fellow who states that the patient would receive the cardiac monitor early tomorrow morning.  After shared decision making, we ultimately decided to continue to monitor patient overnight until she is able to receive the portable cardiac monitor tomorrow.  Patient was agreeable to stay another night. VSS have remained stable.  No recurrent syncopal events.      PMHx   History reviewed. No pertinent past medical history.   Past Surgical History:   Procedure Laterality Date    BREAST SURGERY       SECTION      melanoma Right     arm, 2001ish        Family Hx   No family history on file.     Social Hx   Social History     Socioeconomic History    Marital status:    Tobacco Use    Smoking status: Former   Substance and Sexual Activity    Alcohol use: No     Social Drivers of Health     Housing Stability: Low Risk  (12/3/2024)    Housing Stability Vital Sign     Unable to Pay for Housing in the Last Year: No     Homeless in the Last Year: No        Vital Signs   Vitals:    24 0346 24 0626 24 0730 24 1102   BP: 121/67 (!) 147/67 (!) 140/65 129/60   BP Location:   Left arm Left arm   Patient Position:   Lying Sitting    Pulse: 74 80 69 67   Resp:   16 16   Temp: 98.2 °F (36.8 °C) 98.1 °F (36.7 °C) 98 °F (36.7 °C) 97.9 °F (36.6 °C)   TempSrc: Oral Oral Oral Oral   SpO2: 96% 98% 95% 95%   Weight:       Height:            Review of Systems  Review of Systems   Respiratory:  Negative for shortness of breath.    Cardiovascular:  Negative for chest pain.       Brief Physical Exam/Reassessment   Physical Exam  Vitals reviewed.   Constitutional:       General: She is not in acute distress.     Appearance: She is not diaphoretic.   HENT:      Head: Normocephalic and atraumatic.   Cardiovascular:      Rate and Rhythm: Normal rate and regular rhythm.      Heart sounds: Heart sounds not distant. No murmur heard.     No friction rub. No gallop.   Pulmonary:      Effort: Pulmonary effort is normal. No respiratory distress.      Breath sounds: Normal breath sounds. No decreased breath sounds, wheezing, rhonchi or rales.   Chest:      Chest wall: No tenderness.   Musculoskeletal:      Cervical back: Neck supple.   Skin:     General: Skin is warm and dry.   Neurological:      Mental Status: She is alert.   Psychiatric:         Mood and Affect: Mood and affect normal.         Labs/Imaging   Labs Reviewed   CBC W/ AUTO DIFFERENTIAL - Abnormal       Result Value    WBC 8.80      RBC 3.82 (*)     Hemoglobin 12.7      Hematocrit 38.7       (*)     MCH 33.2 (*)     MCHC 32.8      RDW 12.6      Platelets 199      MPV 9.6      Immature Granulocytes 0.3      Gran # (ANC) 7.4      Immature Grans (Abs) 0.03      Lymph # 0.8 (*)     Mono # 0.5      Eos # 0.0      Baso # 0.03      nRBC 0      Gran % 84.2 (*)     Lymph % 9.4 (*)     Mono % 5.7      Eosinophil % 0.1      Basophil % 0.3      Differential Method Automated     COMPREHENSIVE METABOLIC PANEL - Abnormal    Sodium 139      Potassium 3.8      Chloride 105      CO2 24      Glucose 118 (*)     BUN 17      Creatinine 0.9      Calcium 9.0      Total Protein 7.0      Albumin 3.8      Total Bilirubin  0.5      Alkaline Phosphatase 65      AST 25      ALT 20      eGFR >60.0      Anion Gap 10     B-TYPE NATRIURETIC PEPTIDE - Abnormal     (*)    URINALYSIS, REFLEX TO URINE CULTURE - Abnormal    Specimen UA Urine, Clean Catch      Color, UA Yellow      Appearance, UA Clear      pH, UA 6.0      Specific Gravity, UA 1.020      Protein, UA Negative      Glucose, UA Negative      Ketones, UA 2+ (*)     Bilirubin (UA) Negative      Occult Blood UA Negative      Nitrite, UA Negative      Leukocytes, UA Negative      Narrative:     Specimen Source->Urine   CBC W/ AUTO DIFFERENTIAL - Abnormal    WBC 6.05      RBC 3.60 (*)     Hemoglobin 12.2      Hematocrit 35.8 (*)     MCV 99 (*)     MCH 33.9 (*)     MCHC 34.1      RDW 12.6      Platelets 196      MPV 9.9      Immature Granulocytes 0.3      Gran # (ANC) 3.8      Immature Grans (Abs) 0.02      Lymph # 1.7      Mono # 0.5      Eos # 0.1      Baso # 0.03      nRBC 0      Gran % 62.2      Lymph % 28.8      Mono % 7.4      Eosinophil % 0.8      Basophil % 0.5      Differential Method Automated     BASIC METABOLIC PANEL - Abnormal    Sodium 140      Potassium 3.7      Chloride 106      CO2 21 (*)     Glucose 91      BUN 15      Creatinine 0.8      Calcium 8.5 (*)     Anion Gap 13      eGFR >60.0     TROPONIN I    Troponin I 0.015     TROPONIN I    Troponin I <0.006        Imaging Results              MRI Brain W WO Contrast (Final result)  Result time 12/03/24 16:55:43      Final result by Gabriel Davis DO (12/03/24 16:55:43)                   Impression:      Mild cerebral volume loss with scattered patchy and confluent T2 FLAIR signal abnormality supratentorial white matter and lory which is nonspecific and may represent mild chronic ischemic change.    Otherwise unremarkable MRI brain as detailed above specifically without evidence for acute infarction or intracranial enhancing mass lesion.      Electronically signed by: Gabriel Davis    Date:    12/03/2024  Time:    16:55               Narrative:    EXAMINATION:  MRI BRAIN W WO CONTRAST    CLINICAL HISTORY:  Syncope, recurrent;    TECHNIQUE:  Sagittal and axial T1, axial T2, axial FLAIR, axial gradient, axial diffusion imaging of the whole brain pre-contrast with postcontrast axial T1 and axial spoiled gradient imaging reformatted in the coronal and sagittal planes.. Six ml of Gadavist injected intravenously.    COMPARISON:  CT 12/02/2024    FINDINGS:  Generalized cerebral volume loss with compensatory enlargement ventricles sulci and cisterns.  Ventricles relatively stable without evidence for hydrocephalus.  There is patchy and confluent T2 FLAIR signal hyperintensity supratentorial white matter with ill-defined component lory while nonspecific concerning for underlying chronic ischemic change.  There is no restricted diffusion to suggest acute or recent infarction.  Punctate foci of susceptibility within the left occipital lobe and right parietal lobe suggestive for remote microhemorrhages.  No evidence for recent hemorrhage.  No abnormal parenchymal enhancement.                                       US Carotid Bilateral (Final result)  Result time 12/02/24 23:49:04      Final result by John Bianchi DO (12/02/24 23:49:04)                   Impression:      No evidence of a hemodynamically significant carotid bifurcation stenosis.      Electronically signed by: John Bianchi  Date:    12/02/2024  Time:    23:49               Narrative:    EXAMINATION:  US CAROTID BILATERAL    CLINICAL HISTORY:  Syncope and collapse    TECHNIQUE:  Grayscale and color Doppler ultrasound examination of the carotid and vertebral artery systems bilaterally.  Stenosis estimates are per the NASCET measurement criteria.    COMPARISON:  None.    FINDINGS:  Right:    Internal Carotid Artery (ICA) peak systolic velocity 112 cm/sec    ICA/CCA peak systolic velocity ratio: 1.6    Plaque formation:  Homogeneous    Vertebral artery: Antegrade flow and normal waveform.    Left:    Internal Carotid Artery (ICA)  peak systolic velocity 121 cm/sec    ICA/CCA peak systolic velocity ratio: 1.4    Plaque formation: Homogeneous    Vertebral artery: Antegrade flow and normal waveform.                                       CT Head Without Contrast (Final result)  Result time 12/02/24 22:48:44      Final result by Nikolas Banegas MD (12/02/24 22:48:44)                   Impression:      No major vascular distribution infarction.    No acute intracranial hemorrhage or depressed calvarial fracture.    Evidence of chronic microvascular ischemic change.    Electronically signed by resident: Deangelo Ghosh  Date:    12/02/2024  Time:    21:43    Electronically signed by: Nikolas Banegas  Date:    12/02/2024  Time:    22:48               Narrative:    EXAMINATION:  CT HEAD WITHOUT CONTRAST    CLINICAL HISTORY:  Head trauma, minor (Age >= 65y);Mental status change, unknown cause;    TECHNIQUE:  Low dose axial CT images obtained throughout the head without intravenous contrast. Sagittal and coronal reconstructions were performed.    COMPARISON:  CT head 11/28/2024    FINDINGS:  Intracranial compartment:    Ventricles and sulci are unchanged in size and configuration without evidence of hydrocephalus. No extra-axial blood or fluid collections.    The brain parenchyma appears unchanged.  There are patchy areas of hypoattenuation in the supratentorial white matter, which is nonspecific and most likely reflects changes of chronic microvascular ischemic changes.  No new major vascular distribution infarction.  No acute parenchymal hemorrhage.  No mass effect or midline shift.    Coarse intracranial ICA and vertebrobasilar arterial calcifications.    Skull/extracranial contents (limited evaluation):    No depressed calvarial fracture.  Mastoid air cells and paranasal sinuses are essentially clear.                                        I reviewed all labs, imaging, EKGs.     Plan   -In the ED, ECG with NSR. Nonspecific T wave changes noted. Normal intervals. No STEMI.   -Labs without signs of leukocytosis, anemia, electrolyte abn, or KADE. Glucose 118. Normal LFTs.   -troponins x2 negative  -No prodromal symptoms today with episode. History of syncope over the summer and symptoms with exertion in the past.  -CT head and US carotid without acute process  -UA without evidence of infection  -ECHO without acute cardiac abnormalities  -Tele  -MRI brain without acute process  -will continue to monitor overnight to receive cardiac event monitor tomorrow without break in telemetry monitoring.  - I have ordered an EEG since patient will stay in Obs.  This may not be done until the afternoon.  Son is aware.  EEG can be cancelled if patient has received cardiac monitor and is ready for discharge.     I have discussed this case with SHAHEEN Mascorro.

## 2024-12-06 NOTE — DISCHARGE SUMMARY
ED Observation Unit  Discharge Summary        History of Present Illness:    Parvin Engel is a 79 y.o. female with a history of Alzheimer's dementia who [place] [facility] [place] on [place]24 for emergent evaluation of syncope today.      Patient was at the airport waiting to board a flight to return home after visiting her son and daughter-in-law, who are both [facility] physicians.  Family member who accompanied her states that she passed out while seated in the gate waiting area for her flight.  She is alert and oriented to person, , and situation, but not year or location. The patient denies prodrome symptoms prior to syncope today. She had felt well today before this occurred.  Her son states that she slipped in the shower 3 days ago and went to [facility] ED where she had a head CT that was negative.  She has not fallen since.  She has some bruising on her eye that was from that fall. Son also mentions a similar episode over the summer while she pasted out at the park. They have also noticed patient having episodes of clamminess with exertion in the past, but none recently. She did eat lunch today. She denies neck pain, back pain, chest pain, SOB, abd pain, extremity weakness, paresthesias, history of arrhythmias, MIs, HF, DVT/PE, hormone use, tobacco use, or cancer. She also denies infections symptoms such as cough, nausea, vomiting, diarrhea, dysuria, frequency.     In the ED, ECG with NSR. Nonspecific T wave changes noted. Normal intervals. No STEMI. Labs without signs of leukocytosis, anemia, electrolyte abn, or KADE. Glucose 118. Normal LFTs. 1st troponin 0.015. .      I reviewed the ED Provider Note dated 2024 prior to my evaluation of this patient.  I reviewed all labs and imaging performed in the Main ED, prior to patient being placed in Observation. Patient was placed in the ED Observation Unit for syncope.     Observation Course:    Patient was placed in the EDOU for further evaluation of  syncope. Vital signs stable throughout observation course. She was monitored on telemetry and there was no evidence of arrhythmias. Cardiology, electrophysiology, and neurology were consulted for further assistance. Patient underwent MRI brain w and wo contrast, carotid ultrasound, echocardiogram, and EEG. All were without acute findings. Orthostatics were positive and patient received IVF. Cardiology recommended a 2-4 week cardiac event monitor which was delivered to the bedside. Patient has no complaints and is agreeable to discharge. Patient stable for discharge. Instructed patient to follow-up with PCP within 1-2 weeks once she returns home. Return precautions given and patient verbalized understanding. All questions answered.      Consultants:    Neurology   Cardiology   Electrophysiology     Final Diagnosis:  Syncope   Orthostatic blood pressure    Discharge Condition: Good    Disposition: Home or Self Care     Time spent on the discharge of the patient including review of hospital course with the patient. reviewing discharge medications and arranging follow-up care 35 minutes.  Patient was seen and examined on the date of discharge and determined to be suitable for discharge.    Follow Up:  No future appointments.

## 2024-12-23 ENCOUNTER — TELEPHONE (OUTPATIENT)
Dept: ELECTROPHYSIOLOGY | Facility: CLINIC | Age: 79
End: 2024-12-23
Payer: MEDICARE

## 2024-12-23 DIAGNOSIS — R55 SYNCOPE, UNSPECIFIED SYNCOPE TYPE: Primary | ICD-10-CM

## 2024-12-23 NOTE — TELEPHONE ENCOUNTER
Called patient's daughter, offered new patient spot for Ms. Engel on Monday, January 13th, 2025 at 9:00am with Dr. Castro, daughter agreed to date & time. Daughter Janet wondering if her mother will have a loop placement that same day, let her know Dr. Castro's nurse will be reaching out to her about that, daughter understood.    Asked new patient questions:  No cardiology records outside of [facility]  No device  Monitor on 12/04, echo on 12/03  No surgeries

## 2024-12-23 NOTE — TELEPHONE ENCOUNTER
----- Message from JUNE Browne sent at 12/23/2024  2:12 PM CST -----  Please see below. Please call patients daughter (number is below) and offer an appointment on 1/13 at 9am with Dr. Castro. Fay please also see below. I think Dr. Castro wants to set up Loop procedure while she is here.  ----- Message -----  From: Ld Castro MD  Sent: 12/23/2024   9:26 AM CST  To: Hollie Tillman RN    yes  ----- Message -----  From: Hollie Tillman RN  Sent: 12/23/2024   8:24 AM CST  To: Ld Castro MD    Sure. Can I go to [facility] on one of the clinic days to get her in?  ----- Message -----  From: Ld Castro MD  Sent: 12/20/2024   4:28 PM CST  To: JUNE Erickson,  Can you arrange for this patient to follow up with me in mid-January.  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   The contact is the daughter:  Janet Engel .  She saw Dr. Jasso in the hospital.    I would like to facilitate a loop implantation during the same visit.    thanks

## 2024-12-31 ENCOUNTER — TELEPHONE (OUTPATIENT)
Dept: ELECTROPHYSIOLOGY | Facility: CLINIC | Age: 79
End: 2024-12-31
Payer: MEDICARE

## 2024-12-31 NOTE — TELEPHONE ENCOUNTER
Spoke with patient's daughter. Advised that I will speak with Dr Castro on Thursday to get details. We cannot submit for ILR approval until I have clinic notes. Will call her on Thursday as soon as I speak with Dr Castro.

## 2024-12-31 NOTE — TELEPHONE ENCOUNTER
----- Message from Mony sent at 12/31/2024 10:44 AM CST -----  Regarding: loop implant  New Patient on 1/13/25 coming from [place].  Her daughter has been waiting for a call back to discuss possible loop recorder implant so that she can prepare for the stay.  Pt's daughter Janet can be reached at [phone].    Thank you

## 2025-01-02 ENCOUNTER — TELEPHONE (OUTPATIENT)
Dept: ELECTROPHYSIOLOGY | Facility: CLINIC | Age: 80
End: 2025-01-02
Payer: MEDICARE

## 2025-01-02 NOTE — TELEPHONE ENCOUNTER
----- Message from Mony sent at 12/31/2024 10:44 AM CST -----  Regarding: loop implant  New Patient on 1/13/25 coming from [place].  Her daughter has been waiting for a call back to discuss possible loop recorder implant so that she can prepare for the stay.  Pt's daughter Janet can be reached at [phone].    Thank you

## 2025-01-02 NOTE — TELEPHONE ENCOUNTER
Spoke with Dr Castro. He initially wanted to schedule ILR and office visit on the same day. He didn't realize that we need to submit clinic notes and event monitor to get authorization and that it can take 10-15 business days to get auth. He is now recommending that patient keep appt for office visit on 1/13/25 and then schedule ILR for 2-3 weeks later. Left message for patient's daughter Janet to return call to review recommendations.

## 2025-01-08 NOTE — H&P (VIEW-ONLY)
Subjective   Patient ID:  Parvin Engel is a 79 y.o. female who presents for follow-up of syncope      HPI 81 yo female with syncope, Alzheimer's dementia (mild),  She is the mother of Dr. Wilder Engel. Lives primarily in [place].    Has had recurrent syncope.1st episode occurred while in the shower >> hit her head. Subsequent imaging was negative.  Had recurrent syncope while sitting in the airport awaiting return to [place]. Admitted here. No events on telemetry and work-up was negative.  3 of the 4 episodes were while sitting.    Echo 12/3/24    Left Ventricle: The left ventricle is normal in size. Normal wall thickness. There is normal systolic function with a visually estimated ejection fraction of 60 - 65%. There is normal diastolic function.    Right Ventricle: Normal right ventricular cavity size. Wall thickness is normal. Systolic function is normal.    Aortic Valve: The aortic valve is a trileaflet valve. There is mild aortic valve sclerosis.    Tricuspid Valve: There is mild regurgitation.    Pulmonary Artery: The estimated pulmonary artery systolic pressure is at least 29 mmHg.     Event monitor 12/24 negative.      Review of Systems   Constitutional: Negative. Negative for fever and malaise/fatigue.   HENT:  Negative for congestion and sore throat.    Cardiovascular:  Negative for chest pain, dyspnea on exertion, irregular heartbeat, leg swelling, near-syncope, orthopnea, palpitations, paroxysmal nocturnal dyspnea and syncope.   Respiratory:  Negative for cough and shortness of breath.    Gastrointestinal:  Negative for abdominal pain, constipation and diarrhea.   Neurological:  Negative for dizziness, light-headedness and weakness.   Psychiatric/Behavioral:  Negative for depression. The patient is not nervous/anxious.    All other systems reviewed and are negative.         Objective     Physical Exam  Constitutional:       Appearance: She is well-developed.   Eyes:      General: No  scleral icterus.     Conjunctiva/sclera: Conjunctivae normal.   Neck:      Vascular: No JVD.      Trachea: No tracheal deviation.   Cardiovascular:      Rate and Rhythm: Normal rate and regular rhythm.      Chest Wall: PMI is not displaced.   Pulmonary:      Effort: Pulmonary effort is normal. No respiratory distress.      Breath sounds: Normal breath sounds.   Abdominal:      Palpations: Abdomen is soft.      Tenderness: There is no abdominal tenderness.   Musculoskeletal:         General: No tenderness.   Skin:     General: Skin is warm and dry.      Findings: No rash.   Neurological:      Mental Status: She is alert and oriented to person, place, and time.   Psychiatric:         Behavior: Behavior normal.            Assessment and Plan     1. Syncope, unspecified syncope type        Plan:     Recurrent syncope, the majority of which are while seating. No objective evidence of pathology.   Recommend ILR. Risks and benefits discussed, she would like to proceed.

## 2025-01-08 NOTE — PROGRESS NOTES
Subjective   Patient ID:  Parvin Engel is a 79 y.o. female who presents for follow-up of syncope      HPI 79 yo female with syncope, Alzheimer's dementia (mild),  She is the mother of Dr. Wilder Engel. Lives primarily in [place].    Has had recurrent syncope.1st episode occurred while in the shower >> hit her head. Subsequent imaging was negative.  Had recurrent syncope while sitting in the airport awaiting return to [place]. Admitted here. No events on telemetry and work-up was negative.  3 of the 4 episodes were while sitting.    Echo 12/3/24    Left Ventricle: The left ventricle is normal in size. Normal wall thickness. There is normal systolic function with a visually estimated ejection fraction of 60 - 65%. There is normal diastolic function.    Right Ventricle: Normal right ventricular cavity size. Wall thickness is normal. Systolic function is normal.    Aortic Valve: The aortic valve is a trileaflet valve. There is mild aortic valve sclerosis.    Tricuspid Valve: There is mild regurgitation.    Pulmonary Artery: The estimated pulmonary artery systolic pressure is at least 29 mmHg.     Event monitor 12/24 negative.      Review of Systems   Constitutional: Negative. Negative for fever and malaise/fatigue.   HENT:  Negative for congestion and sore throat.    Cardiovascular:  Negative for chest pain, dyspnea on exertion, irregular heartbeat, leg swelling, near-syncope, orthopnea, palpitations, paroxysmal nocturnal dyspnea and syncope.   Respiratory:  Negative for cough and shortness of breath.    Gastrointestinal:  Negative for abdominal pain, constipation and diarrhea.   Neurological:  Negative for dizziness, light-headedness and weakness.   Psychiatric/Behavioral:  Negative for depression. The patient is not nervous/anxious.    All other systems reviewed and are negative.         Objective     Physical Exam  Constitutional:       Appearance: She is well-developed.   Eyes:      General: No  scleral icterus.     Conjunctiva/sclera: Conjunctivae normal.   Neck:      Vascular: No JVD.      Trachea: No tracheal deviation.   Cardiovascular:      Rate and Rhythm: Normal rate and regular rhythm.      Chest Wall: PMI is not displaced.   Pulmonary:      Effort: Pulmonary effort is normal. No respiratory distress.      Breath sounds: Normal breath sounds.   Abdominal:      Palpations: Abdomen is soft.      Tenderness: There is no abdominal tenderness.   Musculoskeletal:         General: No tenderness.   Skin:     General: Skin is warm and dry.      Findings: No rash.   Neurological:      Mental Status: She is alert and oriented to person, place, and time.   Psychiatric:         Behavior: Behavior normal.            Assessment and Plan     1. Syncope, unspecified syncope type        Plan:     Recurrent syncope, the majority of which are while seating. No objective evidence of pathology.   Recommend ILR. Risks and benefits discussed, she would like to proceed.

## 2025-01-09 ENCOUNTER — TELEPHONE (OUTPATIENT)
Dept: ELECTROPHYSIOLOGY | Facility: CLINIC | Age: 80
End: 2025-01-09
Payer: MEDICARE

## 2025-01-13 ENCOUNTER — OFFICE VISIT (OUTPATIENT)
Dept: ELECTROPHYSIOLOGY | Facility: CLINIC | Age: 80
End: 2025-01-13
Payer: MEDICARE

## 2025-01-13 VITALS
BODY MASS INDEX: 26.65 KG/M2 | WEIGHT: 165.81 LBS | DIASTOLIC BLOOD PRESSURE: 70 MMHG | SYSTOLIC BLOOD PRESSURE: 136 MMHG | HEIGHT: 66 IN | HEART RATE: 79 BPM

## 2025-01-13 DIAGNOSIS — R55 SYNCOPE, UNSPECIFIED SYNCOPE TYPE: Primary | ICD-10-CM

## 2025-01-13 PROCEDURE — 99212 OFFICE O/P EST SF 10 MIN: CPT | Mod: PBBFAC | Performed by: INTERNAL MEDICINE

## 2025-01-13 PROCEDURE — 99214 OFFICE O/P EST MOD 30 MIN: CPT | Mod: S$PBB,,, | Performed by: INTERNAL MEDICINE

## 2025-01-13 PROCEDURE — 99999 PR PBB SHADOW E&M-EST. PATIENT-LVL II: CPT | Mod: PBBFAC,,, | Performed by: INTERNAL MEDICINE

## 2025-01-14 ENCOUNTER — PATIENT MESSAGE (OUTPATIENT)
Dept: ELECTROPHYSIOLOGY | Facility: CLINIC | Age: 80
End: 2025-01-14
Payer: MEDICARE

## 2025-01-14 DIAGNOSIS — R55 SYNCOPE, UNSPECIFIED SYNCOPE TYPE: Primary | ICD-10-CM

## 2025-01-29 ENCOUNTER — PATIENT MESSAGE (OUTPATIENT)
Dept: ELECTROPHYSIOLOGY | Facility: CLINIC | Age: 80
End: 2025-01-29
Payer: MEDICARE

## 2025-02-10 ENCOUNTER — TELEPHONE (OUTPATIENT)
Dept: ELECTROPHYSIOLOGY | Facility: CLINIC | Age: 80
End: 2025-02-10
Payer: MEDICARE

## 2025-02-10 NOTE — HPI
Parvin Engel is a 79 y.o. female who presents today to [facility] for scheduled ILR implant for sycnope with Dr. Castro. She denies any chest pain, palpitations, SOB, CRAIG, dizziness, light headedness, weakness, LE swelling, syncope, or near syncopal episodes. She denies any bleeding, infections, fevers, rashes, or surgeries in the past 30 days. VSS. Patient is not currently taking AC.    The patient has been examined and the H&P has been reviewed:     I concur with the findings and no changes have occurred since H&P was written.      Review of Systems   Constitution: Negative. Negative for fevers/chills, weakness and malaise/fatigue.   HENT: Negative.  Negative for ear pain and tinnitus.    Eyes: Negative for blurred vision.   Cardiovascular: Negative. Negative for chest pain, dyspnea on exertion, leg swelling, near-syncope, palpitations and syncope.   Respiratory: Negative. Negative for shortness of breath.    Endocrine: Negative.  Negative for polyuria.   Hematologic/Lymphatic: Negative for bruise/bleed easily. Negative for significant bleeding.  Skin: Negative.  Negative for rash.   Musculoskeletal: Negative.  Negative for joint pain and muscle weakness.   Gastrointestinal: Negative.  Negative for abdominal pain, hematemesis, and change in bowel habit.   Genitourinary: Negative for frequency or hematuria.   Neurological: Negative. Negative for dizziness.   Psychiatric/Behavioral: Negative. Negative for depression. The patient is not nervous/anxious.       Physical Exam   Constitutional: Oriented to person, place, and time. Appears well-developed and well-nourished.   HENT:   Head: Normocephalic and atraumatic.   Eyes: Conjunctivae, EOM and lids are normal. No scleral icterus.   Neck: Normal range of motion. No JVD present. No tracheal deviation present.  Cardiovascular: Normal rate and intact distal pulses. Regular rhythm present.   Pulses:       Radial pulses are 2+ on the right side, and 2+ on the left  side.   Pulmonary/Chest: Effort normal and breath sounds normal. No accessory muscle usage. No tachypnea. No respiratory distress. No wheezes. *** chest wall device site in good condition.  Abdominal: Soft. Bowel sounds are normal. No distension. There is no tenderness.   Musculoskeletal: Normal range of motion. No edema. No focal numbness or weakness.  Neurological: Alert and oriented to person, place, and time. Normal muscle tone.   Skin: Skin is warm and dry. No rash noted.   Psychiatric: Normal mood and affect. Behavior is normal.   Nursing note and vitals reviewed.       Significant Studies:  ECG today reveals Sr at 73 BPM.  ms. QRS 82 ms. QT/Qtc 388 ms.      Plan:  - ILR implant  - Local anesthetic.     Discussed with the patient the risks, benefits, and alternatives of ILR implant. Our discussion of risks included (but was not limited to) the possibility of pain, infection, bleeding, rare risk of device erosion if left in, and death.   All questions were answered. Patient verbalized understanding and wish to proceed. Consents signed.    Amaris Oliver DNP  Cardiology   [facility] - Short Stay Cardiac Unit    Attending: Ld Castro MD

## 2025-02-10 NOTE — TELEPHONE ENCOUNTER
Spoke to patient's daughterJanet    CONFIRMED procedure arrival time of 2:30pm tomorrow for ILR with Dr Castro    Reiterated instructions including:  -Directions to check in desk  -Fasting not needed  -Confirmed absence of implanted device/stimulator   -Confirmed no fever, cough, or shortness of breath in the past 30 days  -Bathe night prior and morning prior to procedure with Hibiclens solution or an antibacterial soap  -Reviewed current visitor policy    Patient's daughter verbalized understanding of above and appreciated the call.

## 2025-02-11 ENCOUNTER — HOSPITAL ENCOUNTER (OUTPATIENT)
Facility: HOSPITAL | Age: 80
Discharge: HOME | End: 2025-02-11
Attending: INTERNAL MEDICINE | Admitting: INTERNAL MEDICINE
Payer: MEDICARE

## 2025-02-11 VITALS
BODY MASS INDEX: 25.11 KG/M2 | TEMPERATURE: 98 F | OXYGEN SATURATION: 99 % | HEART RATE: 70 BPM | RESPIRATION RATE: 16 BRPM | DIASTOLIC BLOOD PRESSURE: 71 MMHG | SYSTOLIC BLOOD PRESSURE: 164 MMHG | HEIGHT: 67 IN | WEIGHT: 160 LBS

## 2025-02-11 DIAGNOSIS — R55 SYNCOPE, UNSPECIFIED SYNCOPE TYPE: ICD-10-CM

## 2025-02-11 DIAGNOSIS — Z95.9 CARDIAC DEVICE IN SITU: ICD-10-CM

## 2025-02-11 LAB
OHS QRS DURATION: 82 MS
OHS QTC CALCULATION: 427 MS

## 2025-02-11 PROCEDURE — 93005 ELECTROCARDIOGRAM TRACING: CPT

## 2025-02-11 PROCEDURE — 33285 INSJ SUBQ CAR RHYTHM MNTR: CPT | Performed by: INTERNAL MEDICINE

## 2025-02-11 PROCEDURE — C1764 EVENT RECORDER, CARDIAC: HCPCS | Performed by: INTERNAL MEDICINE

## 2025-02-11 PROCEDURE — 63600175 PHARM REV CODE 636 W HCPCS: Performed by: INTERNAL MEDICINE

## 2025-02-11 PROCEDURE — 93010 ELECTROCARDIOGRAM REPORT: CPT | Mod: ,,, | Performed by: STUDENT IN AN ORGANIZED HEALTH CARE EDUCATION/TRAINING PROGRAM

## 2025-02-11 PROCEDURE — 33285 INSJ SUBQ CAR RHYTHM MNTR: CPT | Mod: ,,, | Performed by: INTERNAL MEDICINE

## 2025-02-11 DEVICE — INSERTABLE CARDIAC MONITOR
Type: IMPLANTABLE DEVICE | Site: CHEST | Status: FUNCTIONAL
Brand: LUX-DX II+™

## 2025-02-11 RX ORDER — CEFAZOLIN 2 G/1
2 INJECTION, POWDER, FOR SOLUTION INTRAMUSCULAR; INTRAVENOUS
Status: DISCONTINUED | OUTPATIENT
Start: 2025-02-11 | End: 2025-02-11 | Stop reason: HOSPADM

## 2025-02-11 RX ORDER — LIDOCAINE HYDROCHLORIDE AND EPINEPHRINE 10; 10 UG/ML; MG/ML
INJECTION, SOLUTION INFILTRATION; PERINEURAL
Status: DISCONTINUED | OUTPATIENT
Start: 2025-02-11 | End: 2025-02-11 | Stop reason: HOSPADM

## 2025-02-11 RX ORDER — CEFAZOLIN SODIUM 1 G/3ML
INJECTION, POWDER, FOR SOLUTION INTRAMUSCULAR; INTRAVENOUS
Status: DISCONTINUED | OUTPATIENT
Start: 2025-02-11 | End: 2025-02-11 | Stop reason: HOSPADM

## 2025-02-11 NOTE — DISCHARGE SUMMARY
[facility] - Short Stay Cardiac Unit  Cardiology  Discharge Summary      Patient Name: Parvin Engel  MRN: [id number]  Admission Date: 2/11/2025  Hospital Length of Stay: 0 days  Discharge Date and Time:  02/11/2025  Attending Physician: Ld Castro MD    Discharging Provider: Amaris Oliver DNP  Primary Care Physician: Sofiya, Primary Doctor    HPI:   Parvin Engel is a 79 y.o. female who presents today to [facility] for scheduled ILR implant for sycnope with Dr. Castro. She denies any chest pain, palpitations, SOB, CRAGI, dizziness, light headedness, weakness, LE swelling, syncope, or near syncopal episodes. She denies any bleeding, infections, fevers, rashes, or surgeries in the past 30 days. VSS. Patient is not currently taking AC.      Procedure(s) (LRB):  Insertion, Implantable Loop Recorder (N/A)     Hospital Course:  Patient underwent ILR implant, tolerated procedure well with no acute complications noted. Left chest site with Mepilex foam dressing, C/D/I with no bleeding, drainage, hematoma, or pain to site. VSS. Patient to follow up with device clinic 2/28/25 for wound check. Follow up in 3 months with Dr. Castro. Continue all home medications.  Discharge plans/instructions discussed with patient who verbalized understanding and agreement of plans of care. No further questions or concerns voiced at this time. Patient discharged home in stable condition.       Goals of Care Treatment Preferences:  Code Status: Full Code      Final Active Diagnoses:    Diagnosis Date Noted POA    PRINCIPAL PROBLEM:  Syncope [R55] 12/03/2024 Yes      Problems Resolved During this Admission:     No new Assessment & Plan notes have been filed under this hospital service since the last note was generated.  Service: Cardiology      Discharged Condition: stable    Disposition: Home or Self Care    Follow Up:   Follow-up Information       DEVICE CHECK CLINIC Follow up on 2/28/2025.    Why: For wound re-check              Ld Castro MD Follow up in 3 month(s).    Specialties: Electrophysiology, Cardiology  Why: routine follow up  Contact information:  [address]  [facility] [id number] [phone]                           Patient Instructions:      Other restrictions (specify):   Order Comments: Medications:  -Continue all of your other home medications. No medication changes.    New Medications:  None.    Diet  -You may resume oral intake after you are discharged, as long you have no swallowing difficulties.    Activity:  -As tolerated.    Other Precautions:  -Avoid getting the area wet for about 5 days. You may shower in 24 hours. Do not let beam of shower hit site directly and no scrubbing in area. Do not submerge incision site in water for 2 weeks.    -You can remove the outside bandage in 24 hours. You should not remove the purple skin glue that covers your wound. This will come off on its own.  -Every day, take your temperature and check your incision for signs of infection (redness, swelling, drainage, or warmth) for the next 7 days. It is normal to have some pain around the site and some bruising. However constant pain, severe tenderness and pus/drainage coming from your wound is not normal and you should call your physician immediately or seek attention at the ER. Fevers and chills and feeling ill is not normal and you should seek immediate medical attention.  -Learn to take your own pulse. Keep a record of your results. Ask your doctor what pulse rate means you should call for  medical attention.  -Carry an ID card that contains information about your loop recorder. You can show this card if your  loop recorder sets  off a metal detector. You should also show it to avoid screening with a hand-held security wand.  -Keep your cell phone away from your loop recorder. Don't carry the phone in your shirt pocket, even when it's turned off.  -Avoid strong electrical fields. Examples are those made by radio  "transmitting towers, "ham" radios, and heavy-duty  electrical equipment.  -Avoid leaning over the open hess of a running car. A running engine creates an electrical field.    Follow-Up:  -Follow up for wound check in 2/28/25  -Follow up with Dr. Castro in 3 months.  -Make regular follow-up appointments with your doctor. They will check the loop recorder to make sure it's working  properly.    When to Call Your Doctor:  Call your doctor immediately if you have any of the following:  -Dizziness  -Chest pain  -Bleeding  -Weakness or numbness  -Visual, gait or speech disturbance  -Lack of energy  -Fainting spells  -Twitching chest muscles  -Rapid pulse or pounding heartbeat  -Shortness of breath  -Pain around your loop recorder  -Fever above 100.4°F or other signs of infection (redness, swelling, drainage, or warmth at the incision site)     Notify your health care provider if you experience any of the following:  increased confusion or weakness     Notify your health care provider if you experience any of the following:  persistent dizziness, light-headedness, or visual disturbances     Notify your health care provider if you experience any of the following:  worsening rash     Notify your health care provider if you experience any of the following:  severe persistent headache     Notify your health care provider if you experience any of the following:  difficulty breathing or increased cough     Notify your health care provider if you experience any of the following:  redness, tenderness, or signs of infection (pain, swelling, redness, odor or green/yellow discharge around incision site)     Notify your health care provider if you experience any of the following:  persistent nausea and vomiting or diarrhea     Notify your health care provider if you experience any of the following:  temperature >100.4     Notify your health care provider if you experience any of the following:  severe uncontrolled pain     Remove " dressing in 24 hours     Activity as tolerated     Medications:  Reconciled Home Medications:      Medication List        CONTINUE taking these medications      calcium citrate-vitamin D3 315-200 mg 315 mg-5 mcg (200 unit) per tablet  Commonly known as: CITRACAL+D  Take 1 tablet by mouth 2 (two) times daily.     donepeziL 10 MG tablet  Commonly known as: ARICEPT  Take 10 mg by mouth once daily.     MULTI-VITAMINS WITH IRON ORAL            Plan:  -Device check appt 2/28/25  -F/u with Dr. Castro in 3 months  -Continue home medications  -Notify Arrhythmia clinic if any issues    Time spent on the discharge of patient: 10 minutes    Amaris Oliver DNP  Cardiology  [facility] - Short Stay Cardiac Unit    Attending: Ld Castro MD

## 2025-02-11 NOTE — PLAN OF CARE
Received report from Hoda. Patient s/p loop recorder, AAOx3. VSS, no c/o pain or discomfort at this time, resp even and unlabored. L chest wall incision VALENTINO c DB. No active bleeding. No hematoma noted. Mepilex dressing applied per protocol. Post procedure protocol reviewed with patient and patient's family. Understanding verbalized. Family members at bedside. Nurse call bell within reach.

## 2025-02-11 NOTE — PROGRESS NOTES
Pt DC'd per MD order. Discharge instructions given including activity, wound care, S&S of infections, future appointments, and when to call MD. Medications reviewed including when to take next dose. PIV DC'd, catheter tip intact. Pt declined transport and ambulated off unit with daughter.

## 2025-02-11 NOTE — INTERVAL H&P NOTE
Parvin Engel is a 79 y.o. female who presents today to [facility] for scheduled ILR implant for sycnope with Dr. Castro. She denies any chest pain, palpitations, SOB, CRAIG, dizziness, light headedness, weakness, LE swelling, syncope, or near syncopal episodes. She denies any bleeding, infections, fevers, rashes, or surgeries in the past 30 days. VSS. Patient is not currently taking AC.    The patient has been examined and the H&P has been reviewed:     I concur with the findings and no changes have occurred since H&P was written.      Review of Systems   Constitution: Negative. Negative for fevers/chills, weakness and malaise/fatigue.   HENT: Negative.  Negative for ear pain and tinnitus.    Eyes: Negative for blurred vision.   Cardiovascular: Negative. Negative for chest pain, dyspnea on exertion, leg swelling, near-syncope, palpitations and syncope.   Respiratory: Negative. Negative for shortness of breath.    Endocrine: Negative.  Negative for polyuria.   Hematologic/Lymphatic: Negative for bruise/bleed easily. Negative for significant bleeding.  Skin: Negative.  Negative for rash.   Musculoskeletal: Negative.  Negative for joint pain and muscle weakness.   Gastrointestinal: Negative.  Negative for abdominal pain, hematemesis, and change in bowel habit.   Genitourinary: Negative for frequency or hematuria.   Neurological: Negative. Negative for dizziness.   Psychiatric/Behavioral: Negative. Negative for depression. The patient is not nervous/anxious.       Physical Exam   Constitutional: Oriented to person, place, and time. Appears well-developed and well-nourished.   HENT:   Head: Normocephalic and atraumatic.   Eyes: Conjunctivae, EOM and lids are normal. No scleral icterus.   Neck: Normal range of motion. No JVD present. No tracheal deviation present.  Cardiovascular: Normal rate and intact distal pulses. Regular rhythm present.   Pulses:       Radial pulses are 2+ on the right side, and 2+ on the left  side.   Pulmonary/Chest: Effort normal and breath sounds normal. No accessory muscle usage. No tachypnea. No respiratory distress. No wheezes. L chest wall device site in good condition.  Abdominal: Soft. Bowel sounds are normal. No distension. There is no tenderness.   Musculoskeletal: Normal range of motion. No edema. No focal numbness or weakness.  Neurological: Alert and oriented to person, place, and time. Normal muscle tone.   Skin: Skin is warm and dry. No rash noted.   Psychiatric: Normal mood and affect. Behavior is normal.   Nursing note and vitals reviewed.       Significant Studies:  ECG today reveals Sr at 73 BPM.  ms. QRS 82 ms. QT/Qtc 388 ms.      Plan:  - ILR implant  - Local anesthetic.     Discussed with the patient the risks, benefits, and alternatives of ILR implant. Our discussion of risks included (but was not limited to) the possibility of pain, infection, bleeding, rare risk of device erosion if left in, and death.   All questions were answered. Patient verbalized understanding and wish to proceed. Consents signed.    Amaris Oliver DNP  Cardiology   [facility] - Short Stay Cardiac Unit    Attending: Ld Castro MD

## 2025-02-11 NOTE — DISCHARGE INSTRUCTIONS
"Medications:  -Continue all of your other home medications. No medication changes.    New Medications:  None.    Diet  -You may resume oral intake after you are discharged, as long you have no swallowing difficulties.    Activity:  -As tolerated.    Other Precautions:  -Avoid getting the area wet for about 5 days. You may shower in 24 hours. Do not let beam of shower hit site directly and no scrubbing in area. Do not submerge incision site in water for 2 weeks.    -You can remove the outside bandage in 24 hours. You should not remove the purple skin glue that covers your wound. This will come off on its own.  -Every day, take your temperature and check your incision for signs of infection (redness, swelling, drainage, or warmth) for the next 7 days. It is normal to have some pain around the site and some bruising. However constant pain, severe tenderness and pus/drainage coming from your wound is not normal and you should call your physician immediately or seek attention at the ER. Fevers and chills and feeling ill is not normal and you should seek immediate medical attention.  -Learn to take your own pulse. Keep a record of your results. Ask your doctor what pulse rate means you should call for  medical attention.  -Carry an ID card that contains information about your loop recorder. You can show this card if your  loop recorder sets  off a metal detector. You should also show it to avoid screening with a hand-held security wand.  -Keep your cell phone away from your loop recorder. Don't carry the phone in your shirt pocket, even when it's turned off.  -Avoid strong electrical fields. Examples are those made by radio transmitting towers, "ham" radios, and heavy-duty  electrical equipment.  -Avoid leaning over the open hess of a running car. A running engine creates an electrical field.    Follow-Up:  -Follow up for wound check in 2/28/25  -Follow up with Dr. Castro in 3 months.  -Make regular follow-up appointments " with your doctor. They will check the loop recorder to make sure it's working  properly.    When to Call Your Doctor:  Call your doctor immediately if you have any of the following:  -Dizziness  -Chest pain  -Bleeding  -Weakness or numbness  -Visual, gait or speech disturbance  -Lack of energy  -Fainting spells  -Twitching chest muscles  -Rapid pulse or pounding heartbeat  -Shortness of breath  -Pain around your loop recorder  -Fever above 100.4°F or other signs of infection (redness, swelling, drainage, or warmth at the incision site)

## 2025-02-11 NOTE — HOSPITAL COURSE
Patient underwent ILR implant, tolerated procedure well with no acute complications noted. Left chest site with Mepilex foam dressing, C/D/I with no bleeding, drainage, hematoma, or pain to site. VSS. Patient to follow up with device clinic 2/28/25 for wound check. Follow up in 3 months with Dr. Castro. Continue all home medications.  Discharge plans/instructions discussed with patient who verbalized understanding and agreement of plans of care. No further questions or concerns voiced at this time. Patient discharged home in stable condition.       Plan:  -Device check appt 2/28/25  -F/u with Dr. Castro in 3 months  -Continue home medications  -Notify Arrhythmia clinic if any issues    Attending: Ld Castro MD

## 2025-02-12 ENCOUNTER — LAB VISIT (OUTPATIENT)
Dept: LAB | Facility: HOSPITAL | Age: 80
End: 2025-02-12
Attending: UROLOGY
Payer: MEDICARE

## 2025-02-12 DIAGNOSIS — N39.0 ACUTE UTI: ICD-10-CM

## 2025-02-12 DIAGNOSIS — N39.0 ACUTE UTI: Primary | ICD-10-CM

## 2025-02-12 LAB
BILIRUB UR QL STRIP: NEGATIVE
CLARITY UR REFRACT.AUTO: CLEAR
COLOR UR AUTO: YELLOW
GLUCOSE UR QL STRIP: NEGATIVE
HGB UR QL STRIP: NEGATIVE
KETONES UR QL STRIP: NEGATIVE
LEUKOCYTE ESTERASE UR QL STRIP: ABNORMAL
MICROSCOPIC COMMENT: ABNORMAL
NITRITE UR QL STRIP: NEGATIVE
PH UR STRIP: 6 [PH] (ref 5–8)
PROT UR QL STRIP: NEGATIVE
RBC #/AREA URNS AUTO: 9 /HPF (ref 0–4)
SP GR UR STRIP: 1.02 (ref 1–1.03)
SQUAMOUS #/AREA URNS AUTO: 4 /HPF
URN SPEC COLLECT METH UR: ABNORMAL
WBC #/AREA URNS AUTO: 65 /HPF (ref 0–5)

## 2025-02-12 PROCEDURE — 81001 URINALYSIS AUTO W/SCOPE: CPT | Performed by: UROLOGY

## 2025-02-12 PROCEDURE — 87086 URINE CULTURE/COLONY COUNT: CPT | Performed by: UROLOGY

## 2025-02-13 LAB — BACTERIA UR CULT: NO GROWTH

## 2025-02-28 ENCOUNTER — CLINICAL SUPPORT (OUTPATIENT)
Dept: CARDIOLOGY | Facility: HOSPITAL | Age: 80
End: 2025-02-28
Attending: INTERNAL MEDICINE
Payer: MEDICARE

## 2025-02-28 DIAGNOSIS — R55 SYNCOPE, UNSPECIFIED SYNCOPE TYPE: ICD-10-CM

## 2025-02-28 LAB — OHS CV DC REMOTE DEVICE TYPE: NORMAL

## 2025-02-28 PROCEDURE — 93285 PRGRMG DEV EVAL SCRMS IP: CPT | Mod: 26,,, | Performed by: INTERNAL MEDICINE

## 2025-03-12 ENCOUNTER — PATIENT MESSAGE (OUTPATIENT)
Dept: ELECTROPHYSIOLOGY | Facility: CLINIC | Age: 80
End: 2025-03-12
Payer: MEDICARE

## 2025-03-19 ENCOUNTER — CLINICAL SUPPORT (OUTPATIENT)
Dept: CARDIOLOGY | Facility: HOSPITAL | Age: 80
End: 2025-03-19
Payer: MEDICARE

## 2025-03-19 ENCOUNTER — CLINICAL SUPPORT (OUTPATIENT)
Dept: CARDIOLOGY | Facility: HOSPITAL | Age: 80
End: 2025-03-19
Attending: INTERNAL MEDICINE
Payer: MEDICARE

## 2025-03-19 DIAGNOSIS — I49.8 OTHER SPECIFIED CARDIAC ARRHYTHMIAS: ICD-10-CM

## 2025-03-19 PROCEDURE — 93298 REM INTERROG DEV EVAL SCRMS: CPT | Performed by: INTERNAL MEDICINE

## 2025-03-19 PROCEDURE — 93298 REM INTERROG DEV EVAL SCRMS: CPT | Mod: 26,,, | Performed by: INTERNAL MEDICINE

## 2025-03-21 LAB
OHS CV AF BURDEN PERCENT: < 1
OHS CV DC REMOTE DEVICE TYPE: NORMAL

## 2025-04-03 ENCOUNTER — TELEPHONE (OUTPATIENT)
Dept: CARDIOLOGY | Facility: HOSPITAL | Age: 80
End: 2025-04-03
Payer: MEDICARE

## 2025-04-03 NOTE — TELEPHONE ENCOUNTER
Returned pt's daughter's call from message left on the [facility] VM.  Pt's daughter had questions about the remote transmission that was received on 3/19/25.  Advised the daughter that was a routine monthly remote transmission that revealed no arrhythmias recorded.  Also advised that a routine check is done every 31 days, if there is a recorded event it will auto transmit and that should the [facility] receive any transmissions of abnormal value they will receive a call from the clinic.  Understanding was verbalized.  The daughter appreciated the call.

## 2025-04-19 ENCOUNTER — CLINICAL SUPPORT (OUTPATIENT)
Dept: CARDIOLOGY | Facility: HOSPITAL | Age: 80
End: 2025-04-19
Payer: MEDICARE

## 2025-04-19 ENCOUNTER — CLINICAL SUPPORT (OUTPATIENT)
Dept: CARDIOLOGY | Facility: HOSPITAL | Age: 80
End: 2025-04-19
Attending: INTERNAL MEDICINE
Payer: MEDICARE

## 2025-04-19 DIAGNOSIS — I49.8 OTHER SPECIFIED CARDIAC ARRHYTHMIAS: ICD-10-CM

## 2025-04-19 PROCEDURE — 93298 REM INTERROG DEV EVAL SCRMS: CPT | Performed by: INTERNAL MEDICINE

## 2025-04-19 PROCEDURE — 93298 REM INTERROG DEV EVAL SCRMS: CPT | Mod: 26,,, | Performed by: INTERNAL MEDICINE

## 2025-05-13 PROBLEM — Z95.818 STATUS POST PLACEMENT OF IMPLANTABLE LOOP RECORDER: Status: ACTIVE | Noted: 2025-05-13

## 2025-05-19 LAB
OHS CV AF BURDEN PERCENT: < 1
OHS CV DC REMOTE DEVICE TYPE: NORMAL

## 2025-05-21 NOTE — PROGRESS NOTES
Ms. Engel is a patient of Dr. Castro and was last seen in clinic 1/13/2025.      Subjective:   Patient ID:  Parvin Engel is a 79 y.o. female who presents for follow up of syncope  .     HPI:    Ms. Engel is a 79 y.o. female with syncope, alzheimer dementia.    Background:  HPI 79 yo female with syncope, Alzheimer's dementia (mild),  She is the mother of Dr. Wilder Engel. Lives primarily in [place].    Has had recurrent syncope.1st episode occurred while in the shower >> hit her head. Subsequent imaging was negative.  Had recurrent syncope while sitting in the airport awaiting return to [place]. Admitted here. No events on telemetry and work-up was negative.  3 of the 4 episodes were while sitting.    Echo 12/3/24    Left Ventricle: The left ventricle is normal in size. Normal wall thickness. There is normal systolic function with a visually estimated ejection fraction of 60 - 65%. There is normal diastolic function.    Right Ventricle: Normal right ventricular cavity size. Wall thickness is normal. Systolic function is normal.    Aortic Valve: The aortic valve is a trileaflet valve. There is mild aortic valve sclerosis.    Tricuspid Valve: There is mild regurgitation.    Pulmonary Artery: The estimated pulmonary artery systolic pressure is at least 29 mmHg.     Event monitor 12/24 negative.   Recurrent syncope, the majority of which are while seating. No objective evidence of pathology.   Recommend ILR. Risks and benefits discussed, she would like to proceed.    Update (05/23/2025):  2/11/2025: Successful implantation of Loop Recorder.     Today she feels overall very well. She is seen with her family at her side. Denies CP, SOB, dizziness, syncope. 3 mo post ILR implant for syncope. Denies any syncope since implant. [facility] [place] [facility] site in good condition.  4/12/25 remote showed pause episode that was false due to under sensing, 0% AF. Continues to see neurology for alzheimer's and syncopal  episodes as well. Her last visit they discussed that episodes of syncope may be due to ARIA but there is no definitive way of knowing.     I have personally reviewed the patient's EKG today, which shows SR with sinus arrhythmia at 72 bpm. TN interval is 154. QRS is 82. QT/QTc is 390/427.    Relevant Cardiac Test Results:    2D Echo (12/3/2024):    Left Ventricle: The left ventricle is normal in size. Normal wall thickness. There is normal systolic function with a visually estimated ejection fraction of 60 - 65%. There is normal diastolic function.    Right Ventricle: Normal right ventricular cavity size. Wall thickness is normal. Systolic function is normal.    Aortic Valve: The aortic valve is a trileaflet valve. There is mild aortic valve sclerosis.    Tricuspid Valve: There is mild regurgitation.    Pulmonary Artery: The estimated pulmonary artery systolic pressure is at least 29 mmHg.    Current Outpatient Medications   Medication Sig    calcium citrate-vitamin D3 315-200 mg (CITRACAL+D) 315-200 mg-unit per tablet Take 1 tablet by mouth 2 (two) times daily.    donepeziL (ARICEPT) 10 MG tablet Take 10 mg by mouth once daily.    multivit with iron,minerals (MULTI-VITAMINS WITH IRON ORAL)     turmeric-ging-olive-oreg-capry 100 mg-150 mg- 50 mg-150 mg Cap Take by mouth.     No current facility-administered medications for this visit.       Review of Systems   Constitutional: Negative for chills, diaphoresis, fever and malaise/fatigue.   HENT: Negative.     Eyes:  Negative for pain and visual disturbance.   Cardiovascular:  Negative for chest pain, dyspnea on exertion, irregular heartbeat, leg swelling, near-syncope, orthopnea, palpitations and syncope.   Respiratory:  Negative for cough, hemoptysis, shortness of breath and wheezing.    Endocrine: Negative.    Hematologic/Lymphatic: Negative.    Skin: Negative.  Negative for rash.   Musculoskeletal:  Negative for falls, joint swelling and myalgias.  "  Gastrointestinal: Negative.  Negative for hematemesis and melena.   Genitourinary: Negative.  Negative for hematuria.   Neurological:  Negative for dizziness, focal weakness, headaches and light-headedness.   Psychiatric/Behavioral:  Negative for altered mental status.        Objective:          BP (!) 141/72 (BP Location: Left arm, Patient Position: Sitting)   Pulse 72   Ht 5' 7" (1.702 m)   Wt 75.3 kg (166 lb 0.1 oz)   BMI 26.00 kg/m²     Physical Exam  Vitals reviewed.   Constitutional:       General: She is not in acute distress.     Appearance: She is not ill-appearing.   HENT:      Head: Atraumatic.      Nose: No congestion.   Eyes:      Pupils: Pupils are equal, round, and reactive to light.   Cardiovascular:      Rate and Rhythm: Normal rate and regular rhythm.      Pulses: Normal pulses.   Pulmonary:      Effort: Pulmonary effort is normal. No respiratory distress.   Abdominal:      General: Abdomen is flat.      Palpations: Abdomen is soft.   Musculoskeletal:         General: Normal range of motion.      Right lower leg: No edema.      Left lower leg: No edema.   Skin:     General: Skin is warm and dry.      Findings: No rash.      Comments: LUCW site in good condition   Neurological:      General: No focal deficit present.      Mental Status: She is alert and oriented to person, place, and time. Mental status is at baseline.   Psychiatric:         Mood and Affect: Mood normal.         Behavior: Behavior normal. Behavior is cooperative.           Lab Results   Component Value Date     12/03/2024    K 3.7 12/03/2024    BUN 15 12/03/2024    CREATININE 0.8 12/03/2024    ALT 20 12/02/2024    AST 25 12/02/2024    HGB 12.2 12/03/2024    HCT 35.8 (L) 12/03/2024             Assessment:     1. Status post placement of implantable loop recorder    2. Syncope, unspecified syncope type    3. Alzheimer's dementia, unspecified dementia severity, unspecified timing of dementia onset, unspecified whether " behavioral, psychotic, or mood disturbance or anxiety          Plan:     In summary, Ms. Engel is a 79 y.o. female with syncope, alzheimer dementia.  3 mo post ILR implant for syncope. [facility] site in good condition. 4/12/25 remote showed a pause episode that was false due to undersensing, 0% AF. No significant pauses or arrhythmias noted on device since implant. Continues to see neurology for alzheimer's and syncopal episodes as well. Her last visit they discussed that episodes of syncope may be due to ARIA but there is no definitive way of knowing.   Continue routine device checks as scheduled. Continue routine f/u with PCP and neurology.    *A copy of this note has been sent to Dr. Castro*    Follow up in about 6 months (around 11/23/2025), or if symptoms worsen or fail to improve.      ------------------------------------------------------------------    ADLOFO Atkins, NP-C  Cardiac Electrophysiology

## 2025-05-22 PROBLEM — G30.9 ALZHEIMER'S DEMENTIA: Status: ACTIVE | Noted: 2025-05-22

## 2025-05-22 PROBLEM — F02.80 ALZHEIMER'S DEMENTIA: Status: ACTIVE | Noted: 2025-05-22

## 2025-05-23 ENCOUNTER — HOSPITAL ENCOUNTER (OUTPATIENT)
Dept: CARDIOLOGY | Facility: CLINIC | Age: 80
Discharge: HOME | End: 2025-05-23
Payer: MEDICARE

## 2025-05-23 ENCOUNTER — OFFICE VISIT (OUTPATIENT)
Dept: ELECTROPHYSIOLOGY | Facility: CLINIC | Age: 80
End: 2025-05-23
Attending: INTERNAL MEDICINE
Payer: MEDICARE

## 2025-05-23 VITALS
HEART RATE: 72 BPM | DIASTOLIC BLOOD PRESSURE: 72 MMHG | SYSTOLIC BLOOD PRESSURE: 141 MMHG | HEIGHT: 67 IN | WEIGHT: 166 LBS | BODY MASS INDEX: 26.06 KG/M2

## 2025-05-23 DIAGNOSIS — F02.80 ALZHEIMER'S DEMENTIA, UNSPECIFIED DEMENTIA SEVERITY, UNSPECIFIED TIMING OF DEMENTIA ONSET, UNSPECIFIED WHETHER BEHAVIORAL, PSYCHOTIC, OR MOOD DISTURBANCE OR ANXIETY: ICD-10-CM

## 2025-05-23 DIAGNOSIS — R55 SYNCOPE, UNSPECIFIED SYNCOPE TYPE: ICD-10-CM

## 2025-05-23 DIAGNOSIS — G30.9 ALZHEIMER'S DEMENTIA, UNSPECIFIED DEMENTIA SEVERITY, UNSPECIFIED TIMING OF DEMENTIA ONSET, UNSPECIFIED WHETHER BEHAVIORAL, PSYCHOTIC, OR MOOD DISTURBANCE OR ANXIETY: ICD-10-CM

## 2025-05-23 DIAGNOSIS — Z95.818 STATUS POST PLACEMENT OF IMPLANTABLE LOOP RECORDER: Primary | ICD-10-CM

## 2025-05-23 LAB
OHS QRS DURATION: 82 MS
OHS QTC CALCULATION: 427 MS

## 2025-05-23 PROCEDURE — 93005 ELECTROCARDIOGRAM TRACING: CPT | Mod: PBBFAC | Performed by: INTERNAL MEDICINE

## 2025-05-23 PROCEDURE — 99999 PR PBB SHADOW E&M-EST. PATIENT-LVL III: CPT | Mod: PBBFAC,,,

## 2025-05-23 PROCEDURE — 99213 OFFICE O/P EST LOW 20 MIN: CPT | Mod: PBBFAC,25

## 2025-05-23 PROCEDURE — 93010 ELECTROCARDIOGRAM REPORT: CPT | Mod: S$PBB,,, | Performed by: INTERNAL MEDICINE

## 2025-05-28 ENCOUNTER — CLINICAL SUPPORT (OUTPATIENT)
Dept: CARDIOLOGY | Facility: HOSPITAL | Age: 80
End: 2025-05-28
Attending: INTERNAL MEDICINE
Payer: MEDICARE

## 2025-05-28 ENCOUNTER — CLINICAL SUPPORT (OUTPATIENT)
Dept: CARDIOLOGY | Facility: HOSPITAL | Age: 80
End: 2025-05-28
Payer: MEDICARE

## 2025-05-28 DIAGNOSIS — I49.8 OTHER SPECIFIED CARDIAC ARRHYTHMIAS: ICD-10-CM

## 2025-05-28 PROCEDURE — 93298 REM INTERROG DEV EVAL SCRMS: CPT | Performed by: INTERNAL MEDICINE

## 2025-05-28 PROCEDURE — 93298 REM INTERROG DEV EVAL SCRMS: CPT | Mod: 26,,, | Performed by: INTERNAL MEDICINE

## 2025-06-16 LAB
OHS CV AF BURDEN PERCENT: < 1
OHS CV DC REMOTE DEVICE TYPE: NORMAL

## 2025-06-28 ENCOUNTER — CLINICAL SUPPORT (OUTPATIENT)
Dept: CARDIOLOGY | Facility: HOSPITAL | Age: 80
End: 2025-06-28
Attending: INTERNAL MEDICINE
Payer: MEDICARE

## 2025-06-28 ENCOUNTER — CLINICAL SUPPORT (OUTPATIENT)
Dept: CARDIOLOGY | Facility: HOSPITAL | Age: 80
End: 2025-06-28
Payer: MEDICARE

## 2025-06-28 DIAGNOSIS — I49.8 OTHER SPECIFIED CARDIAC ARRHYTHMIAS: ICD-10-CM

## 2025-06-28 PROCEDURE — 93298 REM INTERROG DEV EVAL SCRMS: CPT | Performed by: INTERNAL MEDICINE

## 2025-06-28 PROCEDURE — 93298 REM INTERROG DEV EVAL SCRMS: CPT | Mod: 26,,, | Performed by: INTERNAL MEDICINE

## 2025-07-16 LAB
OHS CV AF BURDEN PERCENT: < 1
OHS CV DC REMOTE DEVICE TYPE: NORMAL

## 2025-08-06 ENCOUNTER — CLINICAL SUPPORT (OUTPATIENT)
Dept: CARDIOLOGY | Facility: HOSPITAL | Age: 80
End: 2025-08-06
Attending: INTERNAL MEDICINE
Payer: MEDICARE

## 2025-08-06 ENCOUNTER — CLINICAL SUPPORT (OUTPATIENT)
Dept: CARDIOLOGY | Facility: HOSPITAL | Age: 80
End: 2025-08-06
Payer: MEDICARE

## 2025-08-06 DIAGNOSIS — I49.8 OTHER SPECIFIED CARDIAC ARRHYTHMIAS: ICD-10-CM

## 2025-08-06 LAB
OHS CV AF BURDEN PERCENT: < 1
OHS CV DC REMOTE DEVICE TYPE: NORMAL

## 2025-08-06 PROCEDURE — 93298 REM INTERROG DEV EVAL SCRMS: CPT | Mod: 26,,, | Performed by: INTERNAL MEDICINE

## (undated) DEVICE — DRESSING MEPILEX FLEX 3X3IN

## (undated) DEVICE — ADHESIVE DERMABOND ADVANCED

## (undated) DEVICE — DRAPE OPTIMA MAJOR PEDIATRIC